# Patient Record
Sex: FEMALE | Race: BLACK OR AFRICAN AMERICAN | NOT HISPANIC OR LATINO | URBAN - METROPOLITAN AREA
[De-identification: names, ages, dates, MRNs, and addresses within clinical notes are randomized per-mention and may not be internally consistent; named-entity substitution may affect disease eponyms.]

---

## 2023-05-23 ENCOUNTER — OFFICE VISIT (OUTPATIENT)
Dept: PRIMARY CARE | Facility: CLINIC | Age: 38
End: 2023-05-23
Payer: COMMERCIAL

## 2023-05-23 VITALS
TEMPERATURE: 97.1 F | DIASTOLIC BLOOD PRESSURE: 98 MMHG | BODY MASS INDEX: 41.95 KG/M2 | SYSTOLIC BLOOD PRESSURE: 148 MMHG | HEART RATE: 83 BPM | WEIGHT: 293 LBS | HEIGHT: 70 IN | OXYGEN SATURATION: 97 %

## 2023-05-23 DIAGNOSIS — M54.42 ACUTE LEFT-SIDED LOW BACK PAIN WITH LEFT-SIDED SCIATICA: Primary | ICD-10-CM

## 2023-05-23 PROBLEM — K80.20 CHOLELITHIASIS: Status: ACTIVE | Noted: 2023-05-23

## 2023-05-23 PROBLEM — E66.01 MORBID OBESITY WITH BODY MASS INDEX (BMI) OF 40.0 OR HIGHER (MULTI): Status: ACTIVE | Noted: 2023-05-23

## 2023-05-23 PROBLEM — R79.89 ABNORMAL TSH: Status: ACTIVE | Noted: 2023-05-23

## 2023-05-23 PROCEDURE — 99213 OFFICE O/P EST LOW 20 MIN: CPT | Performed by: FAMILY MEDICINE

## 2023-05-23 PROCEDURE — 1036F TOBACCO NON-USER: CPT | Performed by: FAMILY MEDICINE

## 2023-05-23 RX ORDER — METHYLPREDNISOLONE 4 MG/1
TABLET ORAL
Qty: 21 TABLET | Refills: 0 | Status: SHIPPED | OUTPATIENT
Start: 2023-05-23 | End: 2023-05-30

## 2023-05-23 RX ORDER — CYCLOBENZAPRINE HCL 10 MG
TABLET ORAL
Qty: 30 TABLET | Refills: 0 | Status: SHIPPED | OUTPATIENT
Start: 2023-05-23

## 2023-05-23 RX ORDER — CYCLOBENZAPRINE HCL 10 MG
TABLET ORAL
COMMUNITY
Start: 2023-01-10 | End: 2023-05-23 | Stop reason: SDUPTHER

## 2023-05-23 ASSESSMENT — PATIENT HEALTH QUESTIONNAIRE - PHQ9
1. LITTLE INTEREST OR PLEASURE IN DOING THINGS: NOT AT ALL
SUM OF ALL RESPONSES TO PHQ9 QUESTIONS 1 AND 2: 0
2. FEELING DOWN, DEPRESSED OR HOPELESS: NOT AT ALL

## 2023-05-23 ASSESSMENT — ENCOUNTER SYMPTOMS: BACK PAIN: 1

## 2023-05-23 ASSESSMENT — PAIN SCALES - GENERAL: PAINLEVEL: 9

## 2023-05-23 NOTE — PATIENT INSTRUCTIONS
Recurrence of low back pain with left sided sciatica.  Continue stretches, given by PT.  Medrol dose pack sent to pharmacy, and can use cyclobenzaprine as needed for muscle spasm.  Reach out if not improving, in which case longer steroid taper may be needed, or PT again.

## 2023-05-23 NOTE — PROGRESS NOTES
"Subjective   Patient ID: Neva Allen is a 37 y.o. female who presents for Back Pain (Pt. Presents for sciatica pain ).  Had been doing better after PT.  Has been a lot more active.  Also changed diet.  2.5 weeks about put swing set up, and it started hurting a lot more.    Started as soreness, then progressed to the sciata.  Down left leg.  Was taking ibuprofen, now aleve.     Back Pain        Review of Systems   Musculoskeletal:  Positive for back pain.       Objective   BP (!) 148/98 (BP Location: Left arm, Patient Position: Sitting, BP Cuff Size: Large adult)   Pulse 83   Temp 36.2 °C (97.1 °F) (Temporal)   Ht 1.765 m (5' 9.5\")   Wt 133 kg (294 lb)   LMP 04/25/2023 (Approximate)   SpO2 97%   BMI 42.79 kg/m²     Physical Exam  Constitutional:       General: She is not in acute distress.     Appearance: Normal appearance.   Musculoskeletal:      Lumbar back: Tenderness present. No swelling. Positive left straight leg raise test.        Back:    Neurological:      Mental Status: She is alert.           Assessment/Plan   Problem List Items Addressed This Visit    None  Visit Diagnoses       Acute left-sided low back pain with left-sided sciatica    -  Primary    Relevant Medications    methylPREDNISolone (Medrol Dospak) 4 mg tablets    cyclobenzaprine (Flexeril) 10 mg tablet               "

## 2023-05-26 DIAGNOSIS — M54.42 ACUTE LEFT-SIDED LOW BACK PAIN WITH LEFT-SIDED SCIATICA: Primary | ICD-10-CM

## 2023-05-26 RX ORDER — PREDNISONE 20 MG/1
TABLET ORAL
Qty: 18 TABLET | Refills: 0 | Status: SHIPPED | OUTPATIENT
Start: 2023-05-26

## 2023-05-26 RX ORDER — PREDNISONE 20 MG/1
TABLET ORAL
COMMUNITY
Start: 2023-01-10 | End: 2023-05-26 | Stop reason: SDUPTHER

## 2023-05-31 ENCOUNTER — PATIENT MESSAGE (OUTPATIENT)
Dept: PRIMARY CARE | Facility: CLINIC | Age: 38
End: 2023-05-31
Payer: COMMERCIAL

## 2023-05-31 DIAGNOSIS — M54.42 ACUTE LEFT-SIDED LOW BACK PAIN WITH LEFT-SIDED SCIATICA: Primary | ICD-10-CM

## 2023-06-04 RX ORDER — TRAMADOL HYDROCHLORIDE 50 MG/1
50 TABLET ORAL EVERY 6 HOURS PRN
Qty: 28 TABLET | Refills: 0 | Status: SHIPPED | OUTPATIENT
Start: 2023-06-04 | End: 2023-06-11

## 2023-06-13 DIAGNOSIS — M54.42 ACUTE LEFT-SIDED LOW BACK PAIN WITH LEFT-SIDED SCIATICA: Primary | ICD-10-CM

## 2023-06-15 ENCOUNTER — APPOINTMENT (OUTPATIENT)
Dept: PRIMARY CARE | Facility: CLINIC | Age: 38
End: 2023-06-15
Payer: COMMERCIAL

## 2023-07-07 ENCOUNTER — TELEPHONE (OUTPATIENT)
Dept: PRIMARY CARE | Facility: CLINIC | Age: 38
End: 2023-07-07
Payer: COMMERCIAL

## 2024-07-01 ENCOUNTER — APPOINTMENT (OUTPATIENT)
Dept: OBSTETRICS AND GYNECOLOGY | Facility: CLINIC | Age: 39
End: 2024-07-01
Payer: COMMERCIAL

## 2025-01-28 ENCOUNTER — HOSPITAL ENCOUNTER (EMERGENCY)
Facility: HOSPITAL | Age: 40
Discharge: HOME | End: 2025-01-28

## 2025-01-28 ENCOUNTER — APPOINTMENT (OUTPATIENT)
Dept: RADIOLOGY | Facility: HOSPITAL | Age: 40
End: 2025-01-28

## 2025-01-28 VITALS
DIASTOLIC BLOOD PRESSURE: 83 MMHG | BODY MASS INDEX: 41.95 KG/M2 | SYSTOLIC BLOOD PRESSURE: 151 MMHG | WEIGHT: 293 LBS | RESPIRATION RATE: 18 BRPM | HEIGHT: 70 IN | HEART RATE: 86 BPM | OXYGEN SATURATION: 98 % | TEMPERATURE: 98.6 F

## 2025-01-28 DIAGNOSIS — O20.0 THREATENED MISCARRIAGE (HHS-HCC): Primary | ICD-10-CM

## 2025-01-28 LAB
ABO GROUP (TYPE) IN BLOOD: NORMAL
ALBUMIN SERPL BCP-MCNC: 3.9 G/DL (ref 3.4–5)
ALP SERPL-CCNC: 47 U/L (ref 33–110)
ALT SERPL W P-5'-P-CCNC: 16 U/L (ref 7–45)
ANION GAP SERPL CALC-SCNC: 10 MMOL/L (ref 10–20)
ANTIBODY SCREEN: NORMAL
APPEARANCE UR: ABNORMAL
AST SERPL W P-5'-P-CCNC: 17 U/L (ref 9–39)
B-HCG SERPL-ACNC: ABNORMAL MIU/ML
BASOPHILS # BLD AUTO: 0.06 X10*3/UL (ref 0–0.1)
BASOPHILS NFR BLD AUTO: 0.5 %
BILIRUB SERPL-MCNC: 0.7 MG/DL (ref 0–1.2)
BILIRUB UR STRIP.AUTO-MCNC: NEGATIVE MG/DL
BUN SERPL-MCNC: 7 MG/DL (ref 6–23)
CALCIUM SERPL-MCNC: 8.8 MG/DL (ref 8.6–10.3)
CHLORIDE SERPL-SCNC: 104 MMOL/L (ref 98–107)
CO2 SERPL-SCNC: 25 MMOL/L (ref 21–32)
COLOR UR: ABNORMAL
CREAT SERPL-MCNC: 0.7 MG/DL (ref 0.5–1.05)
EGFRCR SERPLBLD CKD-EPI 2021: >90 ML/MIN/1.73M*2
EOSINOPHIL # BLD AUTO: 0.09 X10*3/UL (ref 0–0.7)
EOSINOPHIL NFR BLD AUTO: 0.7 %
ERYTHROCYTE [DISTWIDTH] IN BLOOD BY AUTOMATED COUNT: 13.2 % (ref 11.5–14.5)
GLUCOSE SERPL-MCNC: 92 MG/DL (ref 74–99)
GLUCOSE UR STRIP.AUTO-MCNC: NORMAL MG/DL
HCT VFR BLD AUTO: 42.5 % (ref 36–46)
HGB BLD-MCNC: 13.7 G/DL (ref 12–16)
HOLD SPECIMEN: NORMAL
IMM GRANULOCYTES # BLD AUTO: 0.07 X10*3/UL (ref 0–0.7)
IMM GRANULOCYTES NFR BLD AUTO: 0.5 % (ref 0–0.9)
KETONES UR STRIP.AUTO-MCNC: ABNORMAL MG/DL
LEUKOCYTE ESTERASE UR QL STRIP.AUTO: ABNORMAL
LYMPHOCYTES # BLD AUTO: 2.49 X10*3/UL (ref 1.2–4.8)
LYMPHOCYTES NFR BLD AUTO: 18.8 %
MCH RBC QN AUTO: 28.6 PG (ref 26–34)
MCHC RBC AUTO-ENTMCNC: 32.2 G/DL (ref 32–36)
MCV RBC AUTO: 89 FL (ref 80–100)
MONOCYTES # BLD AUTO: 0.47 X10*3/UL (ref 0.1–1)
MONOCYTES NFR BLD AUTO: 3.5 %
MUCOUS THREADS #/AREA URNS AUTO: ABNORMAL /LPF
NEUTROPHILS # BLD AUTO: 10.09 X10*3/UL (ref 1.2–7.7)
NEUTROPHILS NFR BLD AUTO: 76 %
NITRITE UR QL STRIP.AUTO: NEGATIVE
NRBC BLD-RTO: 0 /100 WBCS (ref 0–0)
PH UR STRIP.AUTO: 7 [PH]
PLATELET # BLD AUTO: 371 X10*3/UL (ref 150–450)
POTASSIUM SERPL-SCNC: 4 MMOL/L (ref 3.5–5.3)
PROT SERPL-MCNC: 7.7 G/DL (ref 6.4–8.2)
PROT UR STRIP.AUTO-MCNC: ABNORMAL MG/DL
RBC # BLD AUTO: 4.79 X10*6/UL (ref 4–5.2)
RBC # UR STRIP.AUTO: ABNORMAL /UL
RBC #/AREA URNS AUTO: >20 /HPF
RH FACTOR (ANTIGEN D): NORMAL
SODIUM SERPL-SCNC: 135 MMOL/L (ref 136–145)
SP GR UR STRIP.AUTO: 1.02
SQUAMOUS #/AREA URNS AUTO: ABNORMAL /HPF
UROBILINOGEN UR STRIP.AUTO-MCNC: NORMAL MG/DL
WBC # BLD AUTO: 13.3 X10*3/UL (ref 4.4–11.3)
WBC #/AREA URNS AUTO: ABNORMAL /HPF

## 2025-01-28 PROCEDURE — 86901 BLOOD TYPING SEROLOGIC RH(D): CPT | Performed by: PHYSICIAN ASSISTANT

## 2025-01-28 PROCEDURE — 87086 URINE CULTURE/COLONY COUNT: CPT | Mod: AHULAB | Performed by: PHYSICIAN ASSISTANT

## 2025-01-28 PROCEDURE — 36415 COLL VENOUS BLD VENIPUNCTURE: CPT | Performed by: PHYSICIAN ASSISTANT

## 2025-01-28 PROCEDURE — 84702 CHORIONIC GONADOTROPIN TEST: CPT | Performed by: PHYSICIAN ASSISTANT

## 2025-01-28 PROCEDURE — 80053 COMPREHEN METABOLIC PANEL: CPT | Performed by: PHYSICIAN ASSISTANT

## 2025-01-28 PROCEDURE — 76801 OB US < 14 WKS SINGLE FETUS: CPT | Performed by: RADIOLOGY

## 2025-01-28 PROCEDURE — 81001 URINALYSIS AUTO W/SCOPE: CPT | Performed by: PHYSICIAN ASSISTANT

## 2025-01-28 PROCEDURE — 99284 EMERGENCY DEPT VISIT MOD MDM: CPT | Mod: 25

## 2025-01-28 PROCEDURE — 85025 COMPLETE CBC W/AUTO DIFF WBC: CPT | Performed by: PHYSICIAN ASSISTANT

## 2025-01-28 PROCEDURE — 76801 OB US < 14 WKS SINGLE FETUS: CPT

## 2025-01-28 PROCEDURE — 76817 TRANSVAGINAL US OBSTETRIC: CPT | Performed by: RADIOLOGY

## 2025-01-28 RX ORDER — ACETAMINOPHEN 325 MG/1
650 TABLET ORAL AS NEEDED
Status: DISCONTINUED | OUTPATIENT
Start: 2025-01-28 | End: 2025-01-28 | Stop reason: HOSPADM

## 2025-01-28 ASSESSMENT — PAIN DESCRIPTION - ORIENTATION: ORIENTATION: LOWER

## 2025-01-28 ASSESSMENT — PAIN DESCRIPTION - FREQUENCY: FREQUENCY: CONSTANT/CONTINUOUS

## 2025-01-28 ASSESSMENT — PAIN DESCRIPTION - DESCRIPTORS: DESCRIPTORS: CRAMPING

## 2025-01-28 ASSESSMENT — COLUMBIA-SUICIDE SEVERITY RATING SCALE - C-SSRS
6. HAVE YOU EVER DONE ANYTHING, STARTED TO DO ANYTHING, OR PREPARED TO DO ANYTHING TO END YOUR LIFE?: NO
1. IN THE PAST MONTH, HAVE YOU WISHED YOU WERE DEAD OR WISHED YOU COULD GO TO SLEEP AND NOT WAKE UP?: NO
2. HAVE YOU ACTUALLY HAD ANY THOUGHTS OF KILLING YOURSELF?: NO

## 2025-01-28 ASSESSMENT — PAIN DESCRIPTION - PROGRESSION: CLINICAL_PROGRESSION: NOT CHANGED

## 2025-01-28 ASSESSMENT — PAIN DESCRIPTION - ONSET: ONSET: SUDDEN

## 2025-01-28 ASSESSMENT — PAIN SCALES - GENERAL: PAINLEVEL_OUTOF10: 9

## 2025-01-28 ASSESSMENT — PAIN - FUNCTIONAL ASSESSMENT: PAIN_FUNCTIONAL_ASSESSMENT: 0-10

## 2025-01-28 ASSESSMENT — PAIN DESCRIPTION - LOCATION: LOCATION: ABDOMEN

## 2025-01-28 ASSESSMENT — PAIN DESCRIPTION - PAIN TYPE: TYPE: ACUTE PAIN

## 2025-01-28 NOTE — ED TRIAGE NOTES
Patient presents to ED from home for vaginal bleeding while pregnant. Patient endorses bright red blood, clots, and cramping that started this morning after sex. LMP 2024, .

## 2025-01-28 NOTE — DISCHARGE INSTRUCTIONS
Follow up in 48 hours for blood hormone level check and re-evaluation.  If anything worsens in your condition come back to the emergency department.

## 2025-01-28 NOTE — ED PROVIDER NOTES
HPI   Chief Complaint   Patient presents with    Vaginal Bleeding - Pregnant       History of present illness: 39-year-old female presents for vaginal bleeding since last night.  She is currently pregnant.  Her last menstrual period was around 12/15/2024.  Patient states that she had small amount of bleeding and clotting last night.  It did continue this morning.  Patient has associated cramping.  She is a G3, P1 with previous elective .  Patient has her first prenatal appointment coming up in February.  Denies nausea vomiting vaginal discharge dysuria polyuria diarrhea rectal bleeding.  Symptoms began shortly after she had sex with her boyfriend.    Review of systems: Constitutional, eye, ENT, cardiovascular, respiratory, gastrointestinal, genitourinary, neurologic, musculoskeletal, dermatologic, hematologic, endocrine systems were evaluated and were negative unless otherwise specified in history of present illness.    Medications: Reviewed and per nursing note.    Family history: Denies relevant medical conditions.    Social history: Denies tobacco, alcohol, drug use.      Physical exam:    Appearance: Well-developed, well-nourished, nontoxic-appearing, alert and oriented x3. Talking in complete sentences.    HEENT:  Head normocephalic atraumatic, extraocular movements intact, mucous membranes are moist and pink, trachea is midline.    NECK:  Nml Inspection    Respiratory: Clear to auscultation bilaterally with normal bilateral excursion. No wheezes, rhonchi, rales.    Cardiovascular: Regular rate and rhythm, no murmurs rubs or gallops.    GI: Soft, nontender, nondistended    Female genital: Normal external vagina, no active bleeding.    Neuro:  Oriented x 3, Speech Clear, cranial nerves grossly intact.    Musculoskeletal: Patient spontaneously moves all 4 extremities.    Skin:  No rashes.            Patient History   Past Medical History:   Diagnosis Date    Encounter for  screening for  Streptococcus B ([facility])     Screening, , for Streptococcus B    Encounter for screening for diabetes mellitus 2019    Diabetes mellitus screening    Encounter for screening for other disorder     Screening for blood or protein in urine    Encounter for supervision of normal pregnancy, unspecified, unspecified trimester ([facility]) 2019    Encounter for supervision of normal pregnancy    Exceptionally large  baby ([facility]) 2019    Macrosomia    Other conditions influencing health status 2019    History of cough    Personal history of other diseases of the respiratory system 2019    History of influenza    Personal history of other medical treatment 10/03/2019    History of removal of staples     Past Surgical History:   Procedure Laterality Date    OTHER SURGICAL HISTORY  2019    Dilation and curettage    OTHER SURGICAL HISTORY  10/03/2019     section low transverse     No family history on file.  Social History     Tobacco Use    Smoking status: Never     Passive exposure: Never    Smokeless tobacco: Never   Substance Use Topics    Alcohol use: Not on file    Drug use: Not on file       Physical Exam   ED Triage Vitals [25 0854]   Temperature Heart Rate Respirations BP   37 °C (98.6 °F) 86 18 151/83      Pulse Ox Temp Source Heart Rate Source Patient Position   98 % Temporal Monitor Sitting      BP Location FiO2 (%)     Left arm --       Physical Exam      ED Course & MDM   Diagnoses as of 25 1225   Threatened miscarriage ([facility])                 No data recorded     [place] Coma Scale Score: 15 (25 0856 : Daria Patel, TONIO)                           Medical Decision Making  US PELVIS OB TRANSABDOMINAL W TRANSVAGINAL UP TO 1ST TRIMESTER   Final Result    1. Single live intrauterine pregnancy.    2. Estimated gestational age: 6 weeks 1 days +/-3 days.          MACRO:    None          Signed by: Derek Hernandez 2025 11:45 AM    Dictation  workstation:   [id number]       Labs Reviewed  CBC WITH AUTO DIFFERENTIAL - Abnormal     WBC                           13.3 (*)               nRBC                          0.0                    RBC                           4.79                   Hemoglobin                    13.7                   Hematocrit                    42.5                   MCV                           89                     MCH                           28.6                   MCHC                          32.2                   RDW                           13.2                   Platelets                     371                    Neutrophils %                 76.0                   Immature Granulocytes %, Automated   0.5                    Lymphocytes %                 18.8                   Monocytes %                   3.5                    Eosinophils %                 0.7                    Basophils %                   0.5                    Neutrophils Absolute          10.09 (*)               Immature Granulocytes Absolute, Au*   0.07                   Lymphocytes Absolute          2.49                   Monocytes Absolute            0.47                   Eosinophils Absolute          0.09                   Basophils Absolute            0.06                COMPREHENSIVE METABOLIC PANEL - Abnormal     Glucose                       92                     Sodium                        135 (*)                Potassium                     4.0                    Chloride                      104                    Bicarbonate                   25                     Anion Gap                     10                     Urea Nitrogen                 7                      Creatinine                    0.70                   eGFR                          >90                    Calcium                       8.8                    Albumin                       3.9                    Alkaline Phosphatase          47                      Total Protein                 7.7                    AST                           17                     Bilirubin, Total              0.7                    ALT                           16                  HUMAN CHORIONIC GONADOTROPIN, SERUM QUANTITATIVE - Abnormal     HCG, Beta-Quantitative        16,561 (*)                   Narrative:  Total HCG measurement is performed using the [employer] [place] Access                   Immunoassay which detects intact HCG and free beta HCG subunit.                    This test is not indicated for use as a tumor marker.                   HCG testing is performed using a different test methodology at [facility] than other [facility]. Direct result comparison                   should only be made within the same method.                      URINALYSIS WITH REFLEX CULTURE AND MICROSCOPIC - Abnormal     Color, Urine                  Brown (*)               Appearance, Urine             Turbid (*)               Specific Gravity, Urine       1.016                  pH, Urine                     7.0                    Protein, Urine                50 (1+) (*)               Glucose, Urine                Normal                 Blood, Urine                  OVER (3+) (*)               Ketones, Urine                TRACE (*)               Bilirubin, Urine              NEGATIVE                Urobilinogen, Urine           Normal                 Nitrite, Urine                NEGATIVE                Leukocyte Esterase, Urine     250 Aries/uL (*)                   Narrative: OVER is reported when the result is greater than the clinically reportable range.  MICROSCOPIC ONLY, URINE - Abnormal     WBC, Urine                    21-50 (*)               RBC, Urine                    >20 (*)                Squamous Epithelial Cells, Urine   10-25 (FEW)                Mucus, Urine                  FEW                 URINE CULTURE  URINALYSIS WITH REFLEX CULTURE  AND MICROSCOPIC         Narrative: The following orders were created for panel order Urinalysis with Reflex Culture and Microscopic.                  Procedure                               Abnormality         Status                                     ---------                               -----------         ------                                     Urinalysis with Reflex C...[[id number]]  Abnormal            Final result                               Extra Urine Gray Tube[[id number]]                                                                                         Please view results for these tests on the individual orders.  TYPE AND SCREEN  EXTRA URINE GRAY TUBE      39-year-old female complains of vaginal bleeding since last night.  Differential diagnosis of ectopic pregnancy, threatened miscarriage, spontaneous miscarriage, PID.  Examination shows nontender abdomen.  No significant active bleeding.    CBC CMP beta-hCG level urinalysis pelvic ultrasound type and screen ordered.  Patient is O+ and does not require RhoGAM.  Hemoglobin 13.7 which is normal, chemistry showed no significant abnormalities.  A beta-hCG level 16,561.  Reasonable initial test will need to track.  Urinalysis shows contamination of blood with no bacteria or clear urinary tract infection.  Ultrasound shows intrauterine pregnancy with gestational sac calculation at 6 weeks 1 day consistent with patient's last.  Fetal cardiac motion was visualized but not able to be measured.  Patient presentation is consistent with threatened miscarriage with no clear miscarriage or other complication at this time.  Patient will need to follow-up with OB/GYN in 48 hours for repeat evaluation and hCG level.    Patient will be discharged to home with recommendation for rest and to take Tylenol as needed.  Patient is educated in signs and symptoms of worsening symptoms and reasons to come back to the emergency department.  Will need to follow up with  OB/GYN in 48 hours.  Patient does not report social determinants of health impacting ability to obtain care that is needed.  Patient agrees with plan.    This is a transcription.  Text was reviewed for errors, but some transcription errors may remain.  Please call for any questions.          Procedure  Procedures     Selwyn Valentine PA-C  01/28/25 [id number]

## 2025-01-30 LAB — BACTERIA UR CULT: NORMAL

## 2025-02-02 ENCOUNTER — HOSPITAL ENCOUNTER (EMERGENCY)
Facility: HOSPITAL | Age: 40
Discharge: HOME | End: 2025-02-02
Attending: EMERGENCY MEDICINE
Payer: COMMERCIAL

## 2025-02-02 ENCOUNTER — APPOINTMENT (OUTPATIENT)
Dept: RADIOLOGY | Facility: HOSPITAL | Age: 40
End: 2025-02-02
Payer: COMMERCIAL

## 2025-02-02 VITALS
TEMPERATURE: 97.9 F | HEART RATE: 87 BPM | RESPIRATION RATE: 18 BRPM | OXYGEN SATURATION: 99 % | SYSTOLIC BLOOD PRESSURE: 144 MMHG | DIASTOLIC BLOOD PRESSURE: 86 MMHG

## 2025-02-02 DIAGNOSIS — R31.9 URINARY TRACT INFECTION WITH HEMATURIA, SITE UNSPECIFIED: ICD-10-CM

## 2025-02-02 DIAGNOSIS — O20.8 SUBCHORIONIC HEMORRHAGE IN FIRST TRIMESTER (HHS-HCC): ICD-10-CM

## 2025-02-02 DIAGNOSIS — N39.0 URINARY TRACT INFECTION WITH HEMATURIA, SITE UNSPECIFIED: ICD-10-CM

## 2025-02-02 DIAGNOSIS — O46.90 VAGINAL BLEEDING DURING PREGNANCY (HHS-HCC): Primary | ICD-10-CM

## 2025-02-02 LAB
ALBUMIN SERPL BCP-MCNC: 3.7 G/DL (ref 3.4–5)
ALP SERPL-CCNC: 56 U/L (ref 33–110)
ALT SERPL W P-5'-P-CCNC: 13 U/L (ref 7–45)
ANION GAP SERPL CALC-SCNC: 12 MMOL/L (ref 10–20)
APPEARANCE UR: CLEAR
APTT PPP: 31 SECONDS (ref 27–38)
AST SERPL W P-5'-P-CCNC: 15 U/L (ref 9–39)
B-HCG SERPL-ACNC: ABNORMAL MIU/ML
BACTERIA #/AREA URNS AUTO: ABNORMAL /HPF
BASOPHILS # BLD AUTO: 0.09 X10*3/UL (ref 0–0.1)
BASOPHILS NFR BLD AUTO: 0.6 %
BILIRUB SERPL-MCNC: 0.7 MG/DL (ref 0–1.2)
BILIRUB UR STRIP.AUTO-MCNC: NEGATIVE MG/DL
BUN SERPL-MCNC: 8 MG/DL (ref 6–23)
CALCIUM SERPL-MCNC: 9 MG/DL (ref 8.6–10.3)
CHLORIDE SERPL-SCNC: 104 MMOL/L (ref 98–107)
CO2 SERPL-SCNC: 24 MMOL/L (ref 21–32)
COLOR UR: COLORLESS
CREAT SERPL-MCNC: 0.74 MG/DL (ref 0.5–1.05)
EGFRCR SERPLBLD CKD-EPI 2021: >90 ML/MIN/1.73M*2
EOSINOPHIL # BLD AUTO: 0.07 X10*3/UL (ref 0–0.7)
EOSINOPHIL NFR BLD AUTO: 0.5 %
ERYTHROCYTE [DISTWIDTH] IN BLOOD BY AUTOMATED COUNT: 13.2 % (ref 11.5–14.5)
GLUCOSE SERPL-MCNC: 83 MG/DL (ref 74–99)
GLUCOSE UR STRIP.AUTO-MCNC: NORMAL MG/DL
HCT VFR BLD AUTO: 42.8 % (ref 36–46)
HGB BLD-MCNC: 14.2 G/DL (ref 12–16)
IMM GRANULOCYTES # BLD AUTO: 0.07 X10*3/UL (ref 0–0.7)
IMM GRANULOCYTES NFR BLD AUTO: 0.5 % (ref 0–0.9)
INR PPP: 1.2 (ref 0.9–1.1)
KETONES UR STRIP.AUTO-MCNC: NEGATIVE MG/DL
LEUKOCYTE ESTERASE UR QL STRIP.AUTO: ABNORMAL
LYMPHOCYTES # BLD AUTO: 2.69 X10*3/UL (ref 1.2–4.8)
LYMPHOCYTES NFR BLD AUTO: 18.5 %
MCH RBC QN AUTO: 29.6 PG (ref 26–34)
MCHC RBC AUTO-ENTMCNC: 33.2 G/DL (ref 32–36)
MCV RBC AUTO: 89 FL (ref 80–100)
MONOCYTES # BLD AUTO: 0.57 X10*3/UL (ref 0.1–1)
MONOCYTES NFR BLD AUTO: 3.9 %
MUCOUS THREADS #/AREA URNS AUTO: ABNORMAL /LPF
NEUTROPHILS # BLD AUTO: 11.07 X10*3/UL (ref 1.2–7.7)
NEUTROPHILS NFR BLD AUTO: 76 %
NITRITE UR QL STRIP.AUTO: NEGATIVE
NRBC BLD-RTO: 0 /100 WBCS (ref 0–0)
PH UR STRIP.AUTO: 6 [PH]
PLATELET # BLD AUTO: 372 X10*3/UL (ref 150–450)
POTASSIUM SERPL-SCNC: 3.9 MMOL/L (ref 3.5–5.3)
PROT SERPL-MCNC: 7.5 G/DL (ref 6.4–8.2)
PROT UR STRIP.AUTO-MCNC: NEGATIVE MG/DL
PROTHROMBIN TIME: 13.7 SECONDS (ref 9.8–12.8)
RBC # BLD AUTO: 4.79 X10*6/UL (ref 4–5.2)
RBC # UR STRIP.AUTO: ABNORMAL /UL
RBC #/AREA URNS AUTO: >20 /HPF
SODIUM SERPL-SCNC: 136 MMOL/L (ref 136–145)
SP GR UR STRIP.AUTO: 1
SQUAMOUS #/AREA URNS AUTO: ABNORMAL /HPF
UROBILINOGEN UR STRIP.AUTO-MCNC: NORMAL MG/DL
WBC # BLD AUTO: 14.6 X10*3/UL (ref 4.4–11.3)
WBC #/AREA URNS AUTO: ABNORMAL /HPF

## 2025-02-02 PROCEDURE — 85730 THROMBOPLASTIN TIME PARTIAL: CPT | Performed by: NURSE PRACTITIONER

## 2025-02-02 PROCEDURE — 84443 ASSAY THYROID STIM HORMONE: CPT | Performed by: OBSTETRICS & GYNECOLOGY

## 2025-02-02 PROCEDURE — 85610 PROTHROMBIN TIME: CPT | Performed by: NURSE PRACTITIONER

## 2025-02-02 PROCEDURE — 84702 CHORIONIC GONADOTROPIN TEST: CPT | Performed by: NURSE PRACTITIONER

## 2025-02-02 PROCEDURE — 76801 OB US < 14 WKS SINGLE FETUS: CPT

## 2025-02-02 PROCEDURE — 87389 HIV-1 AG W/HIV-1&-2 AB AG IA: CPT | Mod: AHULAB | Performed by: OBSTETRICS & GYNECOLOGY

## 2025-02-02 PROCEDURE — 36415 COLL VENOUS BLD VENIPUNCTURE: CPT | Performed by: NURSE PRACTITIONER

## 2025-02-02 PROCEDURE — 87086 URINE CULTURE/COLONY COUNT: CPT | Mod: AHULAB | Performed by: NURSE PRACTITIONER

## 2025-02-02 PROCEDURE — 76801 OB US < 14 WKS SINGLE FETUS: CPT | Performed by: RADIOLOGY

## 2025-02-02 PROCEDURE — 76817 TRANSVAGINAL US OBSTETRIC: CPT | Performed by: RADIOLOGY

## 2025-02-02 PROCEDURE — 81001 URINALYSIS AUTO W/SCOPE: CPT | Performed by: NURSE PRACTITIONER

## 2025-02-02 PROCEDURE — 86803 HEPATITIS C AB TEST: CPT | Mod: AHULAB | Performed by: OBSTETRICS & GYNECOLOGY

## 2025-02-02 PROCEDURE — 83021 HEMOGLOBIN CHROMOTOGRAPHY: CPT | Mod: AHULAB | Performed by: OBSTETRICS & GYNECOLOGY

## 2025-02-02 PROCEDURE — 85025 COMPLETE CBC W/AUTO DIFF WBC: CPT | Performed by: NURSE PRACTITIONER

## 2025-02-02 PROCEDURE — 83036 HEMOGLOBIN GLYCOSYLATED A1C: CPT | Mod: AHULAB | Performed by: OBSTETRICS & GYNECOLOGY

## 2025-02-02 PROCEDURE — 99284 EMERGENCY DEPT VISIT MOD MDM: CPT | Mod: 25 | Performed by: EMERGENCY MEDICINE

## 2025-02-02 PROCEDURE — 2500000002 HC RX 250 W HCPCS SELF ADMINISTERED DRUGS (ALT 637 FOR MEDICARE OP, ALT 636 FOR OP/ED): Performed by: NURSE PRACTITIONER

## 2025-02-02 PROCEDURE — 80053 COMPREHEN METABOLIC PANEL: CPT | Performed by: NURSE PRACTITIONER

## 2025-02-02 RX ORDER — NITROFURANTOIN 25; 75 MG/1; MG/1
100 CAPSULE ORAL ONCE
Status: COMPLETED | OUTPATIENT
Start: 2025-02-02 | End: 2025-02-02

## 2025-02-02 RX ORDER — NITROFURANTOIN 25; 75 MG/1; MG/1
100 CAPSULE ORAL 2 TIMES DAILY
Qty: 10 CAPSULE | Refills: 0 | Status: SHIPPED | OUTPATIENT
Start: 2025-02-02 | End: 2025-02-07

## 2025-02-02 RX ORDER — ACETAMINOPHEN 500 MG
500 TABLET ORAL EVERY 6 HOURS PRN
Qty: 30 TABLET | Refills: 0 | Status: SHIPPED | OUTPATIENT
Start: 2025-02-02 | End: 2025-02-12

## 2025-02-02 RX ADMIN — NITROFURANTOIN MONOHYDRATE/MACROCRYSTALS 100 MG: 25; 75 CAPSULE ORAL at 17:38

## 2025-02-02 ASSESSMENT — COLUMBIA-SUICIDE SEVERITY RATING SCALE - C-SSRS
1. IN THE PAST MONTH, HAVE YOU WISHED YOU WERE DEAD OR WISHED YOU COULD GO TO SLEEP AND NOT WAKE UP?: NO
6. HAVE YOU EVER DONE ANYTHING, STARTED TO DO ANYTHING, OR PREPARED TO DO ANYTHING TO END YOUR LIFE?: NO
2. HAVE YOU ACTUALLY HAD ANY THOUGHTS OF KILLING YOURSELF?: NO

## 2025-02-02 NOTE — ED TRIAGE NOTES
Pt. C/o vaginal bleeding and passing clots that has gotten worse since last visit on Tuesday. Pt is 7 weeks pregnant.

## 2025-02-02 NOTE — DISCHARGE INSTRUCTIONS
Follow up with OB/GYN on Tuesday as scheduled for further evaluation and management of care    Prescription sent to pharmacy. Take medications as prescribed     Follow up instructions discussed. ED precautions discussed and advised to return to ED if symptoms worsen or persist for follow up.

## 2025-02-03 LAB — BACTERIA UR CULT: NORMAL

## 2025-02-03 NOTE — ED PROVIDER NOTES
HPI     CC: Vaginal Bleeding     HPI: Neva Allen is a 39 y.o. female with no past medical history presents for complaints of worsening vaginal bleeding. She endorses associated suprapubic light cramping and clotting. She reports symptoms initially began shortly after she had sex with her boyfriend. She is currently 6 weeks pregnant. Her last menstrual period was around 12/15/2024. Patient states she had some heavy bleeding and passed a large clot this morning. She is a G3, P1 with previous elective . She is currently scheduled for her first OB appointment on Tuesday. Denies nausea or vomiting. Denies dysuria, or polyuria. She reports symptoms initially began shortly after she had sex with her boyfriend.     ROS: 10-point review of systems was performed and is otherwise negative except as noted in HPI.      Past Medical History: Noncontributory except per HPI     Past Surgical History: Noncontributory except per HPI     Family History: Reviewed and noncontributory     Social History:  Noncontributory except per HPI       Allergies   Allergen Reactions    Cefadroxil Rash    Penicillins Rash and Hives    Sulfamethoxazole-Trimethoprim Rash       Past Medical History:   Diagnosis Date    Encounter for  screening for Streptococcus B     Screening, , for Streptococcus B    Encounter for screening for diabetes mellitus 2019    Diabetes mellitus screening    Encounter for screening for other disorder     Screening for blood or protein in urine    Encounter for supervision of normal pregnancy, unspecified, unspecified trimester 2019    Encounter for supervision of normal pregnancy    Exceptionally large  baby ([facility]) 2019    Macrosomia    Other conditions influencing health status 2019    History of cough    Personal history of other diseases of the respiratory system 2019    History of influenza    Personal history of other medical treatment 10/03/2019     History of removal of staples       Home Meds:   Current Outpatient Medications   Medication Instructions    acetaminophen (TYLENOL) 500 mg, oral, Every 6 hours PRN    cyclobenzaprine (Flexeril) 10 mg tablet TAKE 1/2 TO 1 TABLET BY MOUTH TWICE DAILY AS NEEDED    nitrofurantoin, macrocrystal-monohydrate, (Macrobid) 100 mg capsule 100 mg, oral, 2 times daily    predniSONE (Deltasone) 20 mg tablet TAKE 3 TABLETS BY MOUTH DAILY FOR 3 DAYS, THEN 2 TABLETS DAILY X3 DAYS, THEN 1 TABLET DAILY X3 DAYS        ED Triage Vitals [02/02/25 1445]   Temperature Heart Rate Respirations BP   36.6 °C (97.9 °F) 87 18 144/86      Pulse Ox Temp Source Heart Rate Source Patient Position   99 % Oral Monitor Sitting      BP Location FiO2 (%)     -- --         Heart Rate:  [87]   Temperature:  [36.6 °C (97.9 °F)]   Respirations:  [18]   BP: (144)/(86)   Pulse Ox:  [99 %]      Physical Exam:  Physical Exam  Vitals and nursing note reviewed.   Constitutional:       General: She is not in acute distress.     Appearance: She is well-developed.   HENT:      Head: Normocephalic and atraumatic.   Eyes:      Conjunctiva/sclera: Conjunctivae normal.   Cardiovascular:      Rate and Rhythm: Normal rate and regular rhythm.      Heart sounds: No murmur heard.  Pulmonary:      Effort: Pulmonary effort is normal. No respiratory distress.      Breath sounds: Normal breath sounds.   Abdominal:      General: Bowel sounds are normal.      Palpations: Abdomen is soft.      Tenderness: There is no abdominal tenderness. There is no guarding or rebound. Negative signs include McBurney's sign and psoas sign.   Musculoskeletal:         General: No swelling.      Cervical back: Neck supple.   Skin:     General: Skin is warm and dry.      Capillary Refill: Capillary refill takes less than 2 seconds.   Neurological:      Mental Status: She is alert.   Psychiatric:         Mood and Affect: Mood normal.          Diagnostic Results        Labs Reviewed   HUMAN CHORIONIC  GONADOTROPIN, SERUM QUANTITATIVE - Abnormal       Result Value    HCG, Beta-Quantitative 24,352 (*)     Narrative:      Total HCG measurement is performed using the [employer] [place] Access   Immunoassay which detects intact HCG and free beta HCG subunit.    This test is not indicated for use as a tumor marker.   HCG testing is performed using a different test methodology at [facility] than other [facility]. Direct result comparison   should only be made within the same method.       CBC WITH AUTO DIFFERENTIAL - Abnormal    WBC 14.6 (*)     nRBC 0.0      RBC 4.79      Hemoglobin 14.2      Hematocrit 42.8      MCV 89      MCH 29.6      MCHC 33.2      RDW 13.2      Platelets 372      Neutrophils % 76.0      Immature Granulocytes %, Automated 0.5      Lymphocytes % 18.5      Monocytes % 3.9      Eosinophils % 0.5      Basophils % 0.6      Neutrophils Absolute 11.07 (*)     Immature Granulocytes Absolute, Automated 0.07      Lymphocytes Absolute 2.69      Monocytes Absolute 0.57      Eosinophils Absolute 0.07      Basophils Absolute 0.09     PROTIME-INR - Abnormal    Protime 13.7 (*)     INR 1.2 (*)    URINALYSIS WITH REFLEX CULTURE AND MICROSCOPIC - Abnormal    Color, Urine Colorless (*)     Appearance, Urine Clear      Specific Gravity, Urine 1.005      pH, Urine 6.0      Protein, Urine NEGATIVE      Glucose, Urine Normal      Blood, Urine OVER (3+) (*)     Ketones, Urine NEGATIVE      Bilirubin, Urine NEGATIVE      Urobilinogen, Urine Normal      Nitrite, Urine NEGATIVE      Leukocyte Esterase, Urine 250 Aries/uL (*)     Narrative:     OVER is reported when the result is greater than the clinically reportable range.   MICROSCOPIC ONLY, URINE - Abnormal    WBC, Urine 11-20 (*)     RBC, Urine >20 (*)     Squamous Epithelial Cells, Urine 1-9 (SPARSE)      Bacteria, Urine 1+ (*)     Mucus, Urine FEW     COMPREHENSIVE METABOLIC PANEL - Normal    Glucose 83      Sodium 136      Potassium 3.9      Chloride  104      Bicarbonate 24      Anion Gap 12      Urea Nitrogen 8      Creatinine 0.74      eGFR >90      Calcium 9.0      Albumin 3.7      Alkaline Phosphatase 56      Total Protein 7.5      AST 15      Bilirubin, Total 0.7      ALT 13     APTT - Normal    aPTT 31      Narrative:     The APTT is no longer used for monitoring Unfractionated Heparin Therapy. For monitoring Heparin Therapy, use the Heparin Assay.   URINE CULTURE   URINALYSIS WITH REFLEX CULTURE AND MICROSCOPIC    Narrative:     The following orders were created for panel order Urinalysis with Reflex Culture and Microscopic.  Procedure                               Abnormality         Status                     ---------                               -----------         ------                     Urinalysis with Reflex C...[[id number]]  Abnormal            Final result               Extra Urine Gray Tube[[id number]]                                                         Please view results for these tests on the individual orders.   EXTRA URINE GRAY TUBE         US PELVIS OB TRANSABDOMINAL W TRANSVAGINAL UP TO 1ST TRIMESTER   Final Result   1.  Single living intrauterine gestation of 6 weeks 1 day based on a   crown-rump length. Small subchorionic hemorrhage noted.   2. Normal ultrasound of the adnexa.        MACRO:   None        Signed by: Sary Lucio 2/2/2025 1:56 PM   Dictation workstation:   [id number]                    No data recorded                Procedure  Procedures    ED Course & MDM   Assessment/Plan:     Medications   nitrofurantoin (macrocrystal-monohydrate) (Macrobid) capsule 100 mg (100 mg oral Given 2/2/25 4532)        Diagnoses as of 02/03/25 1401   Vaginal bleeding during pregnancy ([facility])   Urinary tract infection with hematuria, site unspecified   Subchorionic hemorrhage in first trimester ([facility])       Medical Decision Making    Neva Allen is a 39 y.o. female independent historian presents for complaints of worsening vaginal  bleeding, light cramping and clotting. She apparently was seen in here on Tuesday for similar complaints after having sex with her boyfriend for to have a possible threatened miscarriage and advised to repeat HCG (16,561) in two days and follow up with OB. States today had some heavy bleeding and passed a large blood clot and was concerned. She reports did not have HCG repeated and will see OB on Tuesday. She is currently 6 weeks pregnant. She is a G3, P1 with previous elective .     On exam she is alert and oriented X3, NAD noted. Afebrile,VSS. Lungs clear throughout. Heart RRR. Abdomen soft non tender, non distended, BSX4. No palpable pain noted.     Basic labs obtained and WBC 14.6 likely 2/2 urine positive for UTI. HCG increased from 16.561 to 24,352. She is given Nitrofurantoin in the ED for UTI given her allergies to antibiotics. This was the next best option.  US showed single living intrauterine gestation of 6 weeks 1 day based on a crown-rump length. Small subchorionic hemorrhage.    I discussed this patient care with Dr. Woodard who also evaluated the patient.    See Diagnosis.  I discussed findings with patient and they are safe for discharge and outpatient treatment. She is advised to follow up with OB/GYN on Tuesday as scheduled for further evaluation and management of care    Prescription Nitrofurantoin and Acetaminophen sent to pharmacy. Take medications as prescribed     Follow up instructions discussed. ED precautions discussed and advised to return to ED if symptoms worsen or persist for follow up.    Counseling: Spoke with the patient and discussed today´s findings, in addition to providing specific details for the plan of care and expected course. Patient was given the opportunity to ask questions     She expressed understanding was agreeable with plan and discharge at this time. Discharged in hemodynamically stable condition.              Disposition: Home    ED Prescriptions        Medication Sig Dispense Start Date End Date Auth. Provider    nitrofurantoin, macrocrystal-monohydrate, (Macrobid) 100 mg capsule Take 1 capsule (100 mg) by mouth 2 times a day for 5 days. 10 capsule 2/2/2025 2/7/2025 ELKIN Lenz    acetaminophen (Tylenol) 500 mg tablet Take 1 tablet (500 mg) by mouth every 6 hours if needed for mild pain (1 - 3) for up to 10 days. 30 tablet 2/2/2025 2/12/2025 ELKIN Lenz            Social Determinants Affecting Care: none     ELKIN Miller    This note was dictated by speech recognition. Minor errors in transcription may be present.     ELKIN Lenz  02/03/25 9250

## 2025-02-04 ENCOUNTER — APPOINTMENT (OUTPATIENT)
Dept: OBSTETRICS AND GYNECOLOGY | Facility: CLINIC | Age: 40
End: 2025-02-04
Payer: COMMERCIAL

## 2025-02-04 VITALS — DIASTOLIC BLOOD PRESSURE: 72 MMHG | WEIGHT: 293 LBS | SYSTOLIC BLOOD PRESSURE: 120 MMHG | BODY MASS INDEX: 46.49 KG/M2

## 2025-02-04 DIAGNOSIS — E03.9 HYPOTHYROIDISM, UNSPECIFIED TYPE: ICD-10-CM

## 2025-02-04 DIAGNOSIS — O99.210 OBESITY IN PREGNANCY (HHS-HCC): ICD-10-CM

## 2025-02-04 DIAGNOSIS — O09.91 HIGH-RISK PREGNANCY IN FIRST TRIMESTER (HHS-HCC): Primary | ICD-10-CM

## 2025-02-04 DIAGNOSIS — O20.0 THREATENED MISCARRIAGE (HHS-HCC): ICD-10-CM

## 2025-02-04 DIAGNOSIS — O09.521 MULTIGRAVIDA OF ADVANCED MATERNAL AGE IN FIRST TRIMESTER (HHS-HCC): ICD-10-CM

## 2025-02-04 DIAGNOSIS — O26.859 SPOTTING IN PREGNANCY (HHS-HCC): ICD-10-CM

## 2025-02-04 LAB
PREGNANCY TEST URINE, POC: POSITIVE
TSH SERPL-ACNC: 4.26 MIU/L (ref 0.44–3.98)

## 2025-02-04 PROCEDURE — 81025 URINE PREGNANCY TEST: CPT | Performed by: OBSTETRICS & GYNECOLOGY

## 2025-02-04 PROCEDURE — 0500F INITIAL PRENATAL CARE VISIT: CPT | Performed by: OBSTETRICS & GYNECOLOGY

## 2025-02-04 RX ORDER — LEVOTHYROXINE SODIUM 75 UG/1
75 TABLET ORAL
COMMUNITY
Start: 2025-01-06

## 2025-02-04 SDOH — ECONOMIC STABILITY: FOOD INSECURITY: WITHIN THE PAST 12 MONTHS, YOU WORRIED THAT YOUR FOOD WOULD RUN OUT BEFORE YOU GOT MONEY TO BUY MORE.: NEVER TRUE

## 2025-02-04 SDOH — ECONOMIC STABILITY: FOOD INSECURITY: WITHIN THE PAST 12 MONTHS, THE FOOD YOU BOUGHT JUST DIDN'T LAST AND YOU DIDN'T HAVE MONEY TO GET MORE.: NEVER TRUE

## 2025-02-04 SDOH — HEALTH STABILITY: PHYSICAL HEALTH: ON AVERAGE, HOW MANY MINUTES DO YOU ENGAGE IN EXERCISE AT THIS LEVEL?: 30 MIN

## 2025-02-04 SDOH — ECONOMIC STABILITY: TRANSPORTATION INSECURITY
IN THE PAST 12 MONTHS, HAS LACK OF TRANSPORTATION KEPT YOU FROM MEETINGS, WORK, OR FROM GETTING THINGS NEEDED FOR DAILY LIVING?: NO

## 2025-02-04 SDOH — ECONOMIC STABILITY: TRANSPORTATION INSECURITY
IN THE PAST 12 MONTHS, HAS THE LACK OF TRANSPORTATION KEPT YOU FROM MEDICAL APPOINTMENTS OR FROM GETTING MEDICATIONS?: NO

## 2025-02-04 SDOH — HEALTH STABILITY: PHYSICAL HEALTH: ON AVERAGE, HOW MANY DAYS PER WEEK DO YOU ENGAGE IN MODERATE TO STRENUOUS EXERCISE (LIKE A BRISK WALK)?: 7 DAYS

## 2025-02-04 ASSESSMENT — LIFESTYLE VARIABLES
AUDIT-C TOTAL SCORE: 0
HOW MANY STANDARD DRINKS CONTAINING ALCOHOL DO YOU HAVE ON A TYPICAL DAY: PATIENT DOES NOT DRINK
HOW OFTEN DO YOU HAVE SIX OR MORE DRINKS ON ONE OCCASION: NEVER
HOW OFTEN DO YOU HAVE A DRINK CONTAINING ALCOHOL: NEVER
SKIP TO QUESTIONS 9-10: 1

## 2025-02-04 ASSESSMENT — EDINBURGH POSTNATAL DEPRESSION SCALE (EPDS)
I HAVE BEEN ANXIOUS OR WORRIED FOR NO GOOD REASON: NO, NOT AT ALL
I HAVE BLAMED MYSELF UNNECESSARILY WHEN THINGS WENT WRONG: NO, NEVER
I HAVE FELT SCARED OR PANICKY FOR NO GOOD REASON: NO, NOT AT ALL
I HAVE FELT SAD OR MISERABLE: NO, NOT AT ALL
TOTAL SCORE: 0
I HAVE BEEN ABLE TO LAUGH AND SEE THE FUNNY SIDE OF THINGS: AS MUCH AS I ALWAYS COULD
I HAVE BEEN SO UNHAPPY THAT I HAVE BEEN CRYING: NO, NEVER
I HAVE BEEN SO UNHAPPY THAT I HAVE HAD DIFFICULTY SLEEPING: NOT AT ALL
I HAVE LOOKED FORWARD WITH ENJOYMENT TO THINGS: AS MUCH AS I EVER DID
THE THOUGHT OF HARMING MYSELF HAS OCCURRED TO ME: NEVER
THINGS HAVE BEEN GETTING ON TOP OF ME: NO, I HAVE BEEN COPING AS WELL AS EVER

## 2025-02-04 ASSESSMENT — PATIENT HEALTH QUESTIONNAIRE - PHQ9
1. LITTLE INTEREST OR PLEASURE IN DOING THINGS: NOT AT ALL
2. FEELING DOWN, DEPRESSED OR HOPELESS: NOT AT ALL
SUM OF ALL RESPONSES TO PHQ9 QUESTIONS 1 AND 2: 0

## 2025-02-04 NOTE — LETTER
February 4, 2025     Patient: Neva Allen   YOB: 1985   Date of Visit: 2/4/2025       To Whom It May Concern:    Neva Allen was seen in my clinic on 2/3/2025 & 2/4/2025 at 11:00 am. Please excuse Neva for her absence from work on this day to make the appointment.    If you have any questions or concerns, please don't hesitate to call.         Sincerely,         Albina Harding MD        CC: No Recipients

## 2025-02-04 NOTE — PATIENT INSTRUCTIONS
Congratulations on your pregnancy!      Review the new OB information provided today.      Return to the office every 4 weeks in the beginning part of pregnancy.  Your visit will get close together as the pregnancy progresses.      A prescription for baby aspirin has been sent to your pharmacy.  Start taking 2 tablets daily take your decrease the risk of preeclampsia later on in pregnancy.      A refill of your prenatal vitamin has been sent to the pharmacy.  Take 1 tablet daily.      Follow-up with one of our Genetics Counselors.      Routine prenatal labs are being sent today.  Results should be available 24 to 48 hours after blood work has been drawn.  You may call the office and select option #2 to speak with the nurse to obtain the results.      Arrange to have a follow-up ultrasound performed to in 4 to 5 weeks.      Feel free to call the office with any problems, questions or concerns prior to your next scheduled visit.

## 2025-02-05 LAB
EST. AVERAGE GLUCOSE BLD GHB EST-MCNC: 100 MG/DL
HBA1C MFR BLD: 5.1 %
HCV AB SER QL: NONREACTIVE
HEMOGLOBIN A2: 2.5 % (ref 2–3.5)
HEMOGLOBIN A: 97.2 % (ref 95.8–98)
HEMOGLOBIN F: 0.3 % (ref 0–2)
HEMOGLOBIN IDENTIFICATION INTERPRETATION: NORMAL
HIV 1+2 AB+HIV1 P24 AG SERPL QL IA: NONREACTIVE
PATH REVIEW-HGB IDENTIFICATION: NORMAL

## 2025-02-10 ENCOUNTER — TELEPHONE (OUTPATIENT)
Dept: OBSTETRICS AND GYNECOLOGY | Facility: CLINIC | Age: 40
End: 2025-02-10
Payer: COMMERCIAL

## 2025-02-10 NOTE — PROGRESS NOTES
40 yo morbidly obese woman presents for a NOB visit of a desired pregnancy. She was recently seen in the ED with VB, but had a normal ramesh IUP with cardiac activity and a possible small subchorionic hemorrhage.     She denies any severe N/V or sx of PEC.     O: see physical     A/P: HROB - threatened AB, morbid obesity, AMA, prior CD, thyroid disease    -  PNL, plus TSH    -  Genetic Counseling referral     -  NT US     -  bASA d/w the patient and rx written     -  D/W the patient our practice    -  RTC Q 4 weeks     -  NOB packet of info given

## 2025-02-10 NOTE — TELEPHONE ENCOUNTER
RN called patient to discuss results  No answer at this time  RN will follow up with [employer] message  Voicemail left encouraging patient to call the office back.     Amena WILSONN, RN

## 2025-02-10 NOTE — TELEPHONE ENCOUNTER
----- Message from Nurse aSchi MORROW sent at 2/10/2025 10:13 AM EST -----    ----- Message -----  From: Albina Harding MD  Sent: 2/10/2025  12:27 AM EST  To: Do [facility] Clinical Support Staff    Needs Endocrinology follow up for thyroid diease in pregnancy.

## 2025-02-18 PROBLEM — R79.89 ABNORMAL TSH: Status: RESOLVED | Noted: 2023-05-23 | Resolved: 2025-02-18

## 2025-02-18 PROBLEM — Z34.90 ENCOUNTER FOR PREGNANCY RELATED EXAMINATION, ANTEPARTUM: Status: ACTIVE | Noted: 2025-02-18

## 2025-02-18 PROBLEM — O99.280 DISORDER OF THYROID, ANTEPARTUM (HHS-HCC): Status: ACTIVE | Noted: 2025-02-18

## 2025-02-18 PROBLEM — K80.20 CHOLELITHIASIS: Status: RESOLVED | Noted: 2023-05-23 | Resolved: 2025-02-18

## 2025-02-18 PROBLEM — O34.219 PREVIOUS CESAREAN DELIVERY AFFECTING PREGNANCY (HHS-HCC): Status: ACTIVE | Noted: 2025-02-18

## 2025-02-18 PROBLEM — O99.210 OBESITY IN PREGNANCY (HHS-HCC): Status: ACTIVE | Noted: 2025-02-18

## 2025-02-18 PROBLEM — E66.01 MORBID OBESITY WITH BODY MASS INDEX (BMI) OF 40.0 OR HIGHER (MULTI): Status: RESOLVED | Noted: 2023-05-23 | Resolved: 2025-02-18

## 2025-02-18 PROBLEM — E07.9 DISORDER OF THYROID, ANTEPARTUM (HHS-HCC): Status: ACTIVE | Noted: 2025-02-18

## 2025-02-19 ENCOUNTER — APPOINTMENT (OUTPATIENT)
Dept: OBSTETRICS AND GYNECOLOGY | Facility: CLINIC | Age: 40
End: 2025-02-19
Payer: COMMERCIAL

## 2025-02-19 VITALS — WEIGHT: 293 LBS | SYSTOLIC BLOOD PRESSURE: 138 MMHG | BODY MASS INDEX: 45.92 KG/M2 | DIASTOLIC BLOOD PRESSURE: 83 MMHG

## 2025-02-19 DIAGNOSIS — E07.9 DISORDER OF THYROID, ANTEPARTUM (HHS-HCC): ICD-10-CM

## 2025-02-19 DIAGNOSIS — Z34.90 ENCOUNTER FOR PREGNANCY RELATED EXAMINATION, ANTEPARTUM: Primary | ICD-10-CM

## 2025-02-19 DIAGNOSIS — O99.210 OBESITY IN PREGNANCY (HHS-HCC): ICD-10-CM

## 2025-02-19 DIAGNOSIS — O34.219 PREVIOUS CESAREAN DELIVERY AFFECTING PREGNANCY (HHS-HCC): ICD-10-CM

## 2025-02-19 DIAGNOSIS — O99.280 DISORDER OF THYROID, ANTEPARTUM (HHS-HCC): ICD-10-CM

## 2025-02-19 PROCEDURE — 0500F INITIAL PRENATAL CARE VISIT: CPT | Performed by: OBSTETRICS & GYNECOLOGY

## 2025-02-19 RX ORDER — LEVOTHYROXINE SODIUM 100 UG/1
100 TABLET ORAL DAILY
Qty: 30 TABLET | Refills: 11 | Status: SHIPPED | OUTPATIENT
Start: 2025-02-19 | End: 2026-02-19

## 2025-02-19 NOTE — PROGRESS NOTES
Ob Follow-up    SUBJECTIVE    HPI: Neva Allen is a 39 y.o.  at 9w3d here for RPNV.  Labs and previous notes were reviewed.  TSH was elevated, but synthroid has not been adjusted yet, has endocrinology appt in .        OBJECTIVE  Visit Vitals  /83   Wt 145 kg (320 lb)   LMP 12/15/2024   BMI 45.92 kg/m²   OB Status Pregnant   Smoking Status Never   BSA 2.68 m²      See OB flowsheet      ASSESSMENT & PLAN    Neva Allen is a 39 y.o.  at 9w3d here for the following concerns we addressed today:    Problem List Items Addressed This Visit          Ob-Gyn Problems    Previous  delivery affecting pregnancy ([facility])    Overview     2018 PLTCS for macrosomia         Obesity in pregnancy ([facility])    Overview     Early DM screen negative  US:  30w  36w  Weekly NSTs at 36w         Encounter for pregnancy related examination, antepartum - Primary    Overview       [] Accepts Blood Products:   [x] Initial BMI: 46  [] Prenatal Labs:   [x] Last pap: 2022 Negative, negative HPV  [] Genetic Screening:    [] Fetal Sex:   [x] Baby ASA: Yes  [x] Pregnancy dated by: LMP c/w 6w6d US    [] Anatomy US:   [] 1hr GCT at 24-28wks:  [] Rhogam:  [] Tdap Vaccine:  [] RSV (32-36 wks Sep-):   [] Flu Vaccine:    [] GBS at 36 - 37 wks:  [] Labor preferences:  [] 39 weeks discussion of IOL vs. Expectant management:  [] Mode of delivery ( anticipated ):   [] Breastfeeding:  [] Postpartum Birth control method:                     Other    Disorder of thyroid, antepartum ([facility])    Overview     On synthroid 75mcg  Check TSH:  1st Tri 4.26  2nd Tri  3rd Tri              No orders of the defined types were placed in this encounter.     Schedule early anatomy US  Desires cfDNA screen with sex chromosomes  Will have remaining prenatal labs drawn  RTC in 4 weeks      Jason Emery MD

## 2025-03-04 ENCOUNTER — LAB (OUTPATIENT)
Dept: LAB | Facility: HOSPITAL | Age: 40
End: 2025-03-04
Payer: COMMERCIAL

## 2025-03-04 LAB
ABO GROUP (TYPE) IN BLOOD: NORMAL
ANTIBODY SCREEN: NORMAL
RH FACTOR (ANTIGEN D): NORMAL

## 2025-03-04 PROCEDURE — 86901 BLOOD TYPING SEROLOGIC RH(D): CPT

## 2025-03-04 PROCEDURE — 86900 BLOOD TYPING SEROLOGIC ABO: CPT

## 2025-03-04 PROCEDURE — 86850 RBC ANTIBODY SCREEN: CPT

## 2025-03-05 ENCOUNTER — LAB (OUTPATIENT)
Dept: LAB | Facility: HOSPITAL | Age: 40
End: 2025-03-05
Payer: COMMERCIAL

## 2025-03-05 DIAGNOSIS — Z34.90 ENCOUNTER FOR SUPERVISION OF NORMAL PREGNANCY, UNSPECIFIED, UNSPECIFIED TRIMESTER: Primary | ICD-10-CM

## 2025-03-06 LAB
HBV SURFACE AG SERPL QL IA: NORMAL
RUBV IGG SERPL IA-ACNC: 3.85 INDEX
T PALLIDUM AB SER QL IA: NEGATIVE
T4 SERPL-MCNC: 10.3 MCG/DL (ref 5.1–11.9)

## 2025-03-07 ENCOUNTER — APPOINTMENT (OUTPATIENT)
Dept: RADIOLOGY | Facility: HOSPITAL | Age: 40
End: 2025-03-07
Payer: COMMERCIAL

## 2025-03-07 ENCOUNTER — HOSPITAL ENCOUNTER (OUTPATIENT)
Dept: RADIOLOGY | Facility: CLINIC | Age: 40
Discharge: HOME | End: 2025-03-07
Payer: COMMERCIAL

## 2025-03-07 ENCOUNTER — TELEPHONE (OUTPATIENT)
Dept: OBSTETRICS AND GYNECOLOGY | Facility: CLINIC | Age: 40
End: 2025-03-07
Payer: COMMERCIAL

## 2025-03-07 DIAGNOSIS — Z34.90 ENCOUNTER FOR PREGNANCY RELATED EXAMINATION, ANTEPARTUM: ICD-10-CM

## 2025-03-07 DIAGNOSIS — O99.210 OBESITY IN PREGNANCY (HHS-HCC): ICD-10-CM

## 2025-03-07 PROCEDURE — 76801 OB US < 14 WKS SINGLE FETUS: CPT

## 2025-03-07 PROCEDURE — 76817 TRANSVAGINAL US OBSTETRIC: CPT

## 2025-03-07 NOTE — TELEPHONE ENCOUNTER
Nvea Allen called in because she has further questions about her ultrasound results. Patient requested to speak with Dr. Emery but was informed that she is out of the office today. Patient would like a call back at [phone].    Sudha Saxena MA

## 2025-03-07 NOTE — TELEPHONE ENCOUNTER
Called patient to discuss missed AB and next steps  Identified by name and   Empathized with patient over her loss and discussed options and appointments   Scheduled patient for  at 10:15 am with Dr. Owens at [facility].  Discussed bleeding/miscarriage precautions for ED  Patient verbalized understanding, all questions/concerns addressed at this time.    STEVE ParishN RN

## 2025-03-10 ENCOUNTER — PREP FOR PROCEDURE (OUTPATIENT)
Facility: CLINIC | Age: 40
End: 2025-03-10

## 2025-03-10 ENCOUNTER — TELEPHONE (OUTPATIENT)
Facility: CLINIC | Age: 40
End: 2025-03-10

## 2025-03-10 ENCOUNTER — APPOINTMENT (OUTPATIENT)
Facility: CLINIC | Age: 40
End: 2025-03-10
Payer: COMMERCIAL

## 2025-03-10 VITALS
SYSTOLIC BLOOD PRESSURE: 125 MMHG | BODY MASS INDEX: 41.95 KG/M2 | HEIGHT: 70 IN | DIASTOLIC BLOOD PRESSURE: 84 MMHG | WEIGHT: 293 LBS

## 2025-03-10 DIAGNOSIS — O02.1 MISSED ABORTION (HHS-HCC): Primary | ICD-10-CM

## 2025-03-10 PROBLEM — Z34.90 ENCOUNTER FOR PREGNANCY RELATED EXAMINATION, ANTEPARTUM: Status: RESOLVED | Noted: 2025-02-18 | Resolved: 2025-03-10

## 2025-03-10 PROBLEM — O99.280 DISORDER OF THYROID, ANTEPARTUM (HHS-HCC): Status: RESOLVED | Noted: 2025-02-18 | Resolved: 2025-03-10

## 2025-03-10 PROBLEM — E07.9 DISORDER OF THYROID, ANTEPARTUM (HHS-HCC): Status: RESOLVED | Noted: 2025-02-18 | Resolved: 2025-03-10

## 2025-03-10 PROBLEM — O99.210 OBESITY IN PREGNANCY (HHS-HCC): Status: RESOLVED | Noted: 2025-02-18 | Resolved: 2025-03-10

## 2025-03-10 PROCEDURE — 1036F TOBACCO NON-USER: CPT | Performed by: OBSTETRICS & GYNECOLOGY

## 2025-03-10 PROCEDURE — 3008F BODY MASS INDEX DOCD: CPT | Performed by: OBSTETRICS & GYNECOLOGY

## 2025-03-10 PROCEDURE — 99214 OFFICE O/P EST MOD 30 MIN: CPT | Performed by: OBSTETRICS & GYNECOLOGY

## 2025-03-10 RX ORDER — CELECOXIB 400 MG/1
400 CAPSULE ORAL ONCE
Status: CANCELLED | OUTPATIENT
Start: 2025-03-10 | End: 2025-03-10

## 2025-03-10 RX ORDER — GABAPENTIN 600 MG/1
600 TABLET ORAL ONCE
Status: CANCELLED | OUTPATIENT
Start: 2025-03-10 | End: 2025-03-10

## 2025-03-10 RX ORDER — MISOPROSTOL 200 UG/1
800 TABLET ORAL ONCE
Qty: 8 TABLET | Refills: 0 | Status: SHIPPED | OUTPATIENT
Start: 2025-03-10 | End: 2025-03-10

## 2025-03-10 RX ORDER — ACETAMINOPHEN 325 MG/1
975 TABLET ORAL ONCE
Status: CANCELLED | OUTPATIENT
Start: 2025-03-10 | End: 2025-03-10

## 2025-03-10 NOTE — PROGRESS NOTES
Neva Allen is a 39 y.o. female who presents with a chief complaint of Follow-up (Patient had MAB and here to discuss what to do next )      SUBJECTIVE  Patient presents after being diagnosed with a missed AB when she went in for her nuchal ultrasound.  This shows her pregnancy about 7 weeks and 4 days.  It had a heartbeat in her prior ultrasound.  We went over different ways of managing this including surgical and medical.  Organ to try to use Cytotec to induce passage of the products of conception.  Went over the risks and the benefits.    Past Medical History:   Diagnosis Date    Encounter for  screening for Streptococcus B     Screening, , for Streptococcus B    Encounter for screening for diabetes mellitus 2019    Diabetes mellitus screening    Encounter for screening for other disorder     Screening for blood or protein in urine    Encounter for supervision of normal pregnancy, unspecified, unspecified trimester 2019    Encounter for supervision of normal pregnancy    Exceptionally large  baby ([facility]) 2019    Macrosomia    Other conditions influencing health status 2019    History of cough    Personal history of other diseases of the respiratory system 2019    History of influenza    Personal history of other medical treatment 10/03/2019    History of removal of staples     Past Surgical History:   Procedure Laterality Date    CHOLECYSTECTOMY      OTHER SURGICAL HISTORY  2019    Dilation and curettage    OTHER SURGICAL HISTORY  10/03/2019     section low transverse     Social History     Socioeconomic History    Marital status: Significant Other     Spouse name: Kimberlee   Occupational History    Occupation: Mental Health Therapist   Tobacco Use    Smoking status: Never     Passive exposure: Never    Smokeless tobacco: Never   Substance and Sexual Activity    Alcohol use: Not Currently     Comment: social    Drug use: Never    Sexual activity:  Yes     Partners: Male     Birth control/protection: None     Social Drivers of Health     Financial Resource Strain: Low Risk  (5/24/2024)    Received from [facility]    Overall Financial Resource Strain (CARDIA)     Difficulty of Paying Living Expenses: Not hard at all   Food Insecurity: No Food Insecurity (2/4/2025)    Hunger Vital Sign     Worried About Running Out of Food in the Last Year: Never true     Ran Out of Food in the Last Year: Never true   Transportation Needs: No Transportation Needs (2/4/2025)    PRAPARE - Transportation     Lack of Transportation (Medical): No     Lack of Transportation (Non-Medical): No   Physical Activity: Sufficiently Active (2/4/2025)    Exercise Vital Sign     Days of Exercise per Week: 7 days     Minutes of Exercise per Session: 30 min   Stress: No Stress Concern Present (2/4/2025)    [place] [employer] - Occupational Stress Questionnaire     Feeling of Stress : Not at all   Social Connections: Moderately Isolated (5/24/2024)    Received from [facility]    Social Connection and Isolation Panel [NHANES]     Frequency of Communication with Friends and Family: More than three times a week     Frequency of Social Gatherings with Friends and Family: Once a week     Attends Baptism Services: More than 4 times per year     Active Member of Clubs or Organizations: No     Attends Club or Organization Meetings: Never     Marital Status: Never    Intimate Partner Violence: Not At Risk (2/4/2025)    Humiliation, Afraid, Rape, and Kick questionnaire     Fear of Current or Ex-Partner: No     Emotionally Abused: No     Physically Abused: No     Sexually Abused: No   Housing Stability: Low Risk  (5/24/2024)    Received from [facility]    Housing Stability Vital Sign     Unable to Pay for Housing in the Last Year: No     Number of Places Lived in the Last Year: 1     Unstable Housing in the  "Last Year: No     Family History   Problem Relation Name Age of Onset    Asthma Mother      Hypertension Father      Diabetes Father         OB History    Para Term  AB Living   3 1 1   2 1   SAB IAB Ectopic Multiple Live Births   1 1     1      # Outcome Date GA Lbr Carson/2nd Weight Sex Type Anes PTL Lv   3 SAB            2 Term 18 39w0d   M CS-LTranv EPI N KIM   1 IAB  8w0d              OBJECTIVE  Allergies   Allergen Reactions    House Dust Dermatitis    Mold Cough    Sulfamethoxazole Hives    Cefadroxil Rash    Penicillins Rash and Hives    Sulfamethoxazole-Trimethoprim Rash and Other      (Not in a hospital admission)       Review of Systems  History obtained from the patient  General ROS: negative  Psychological ROS: negative  Gastrointestinal ROS: no abdominal pain, change in bowel habits, or black or bloody stools  Musculoskeletal ROS: negative  Physical Exam  General Appearance: awake, alert, oriented, in no acute distress, well developed, well nourished, and in no acute distress  Skin: there are no suspicious lesions or rashes of concern, skin color, texture, turgor are normal; there are no bruises, rashes or lesions.  Head/Face: NCAT  Eyes: No gross abnormalities., PERRL, and EOMI  Neck: neck- supple, no mass, non-tender  Back: no pain to palpation  Abdomen: Soft, non-tender, normal bowel sounds; no bruits, organomegaly or masses.  Extremities: Extremities warm to touch, pink, with no edema.  Musculoskeletal: negative    /84   Ht 1.778 m (5' 10\")   Wt 147 kg (324 lb 9.6 oz)   LMP 12/15/2024   Breastfeeding Unknown   BMI 46.58 kg/m²    Problem List Items Addressed This Visit    None  Visit Diagnoses       Missed  ([facility])    -  Primary    Relevant Medications    miSOPROStoL (Cytotec) 200 mcg tablet    Other Relevant Orders    US PELVIS TRANSABDOMINAL WITH TRANSVAGINAL        Ultrasound and follow-up on Thursday to see if there is passage of products of " conception

## 2025-03-10 NOTE — H&P (VIEW-ONLY)
Neva Allen is a 39 y.o. female who presents with a chief complaint of Follow-up (Patient had MAB and here to discuss what to do next )      SUBJECTIVE  Patient presents after being diagnosed with a missed AB when she went in for her nuchal ultrasound.  This shows her pregnancy about 7 weeks and 4 days.  It had a heartbeat in her prior ultrasound.  We went over different ways of managing this including surgical and medical.  Organ to try to use Cytotec to induce passage of the products of conception.  Went over the risks and the benefits.    Past Medical History:   Diagnosis Date    Encounter for  screening for Streptococcus B     Screening, , for Streptococcus B    Encounter for screening for diabetes mellitus 2019    Diabetes mellitus screening    Encounter for screening for other disorder     Screening for blood or protein in urine    Encounter for supervision of normal pregnancy, unspecified, unspecified trimester 2019    Encounter for supervision of normal pregnancy    Exceptionally large  baby ([facility]) 2019    Macrosomia    Other conditions influencing health status 2019    History of cough    Personal history of other diseases of the respiratory system 2019    History of influenza    Personal history of other medical treatment 10/03/2019    History of removal of staples     Past Surgical History:   Procedure Laterality Date    CHOLECYSTECTOMY      OTHER SURGICAL HISTORY  2019    Dilation and curettage    OTHER SURGICAL HISTORY  10/03/2019     section low transverse     Social History     Socioeconomic History    Marital status: Significant Other     Spouse name: Kimberlee   Occupational History    Occupation: Mental Health Therapist   Tobacco Use    Smoking status: Never     Passive exposure: Never    Smokeless tobacco: Never   Substance and Sexual Activity    Alcohol use: Not Currently     Comment: social    Drug use: Never    Sexual activity:  Yes     Partners: Male     Birth control/protection: None     Social Drivers of Health     Financial Resource Strain: Low Risk  (5/24/2024)    Received from [facility]    Overall Financial Resource Strain (CARDIA)     Difficulty of Paying Living Expenses: Not hard at all   Food Insecurity: No Food Insecurity (2/4/2025)    Hunger Vital Sign     Worried About Running Out of Food in the Last Year: Never true     Ran Out of Food in the Last Year: Never true   Transportation Needs: No Transportation Needs (2/4/2025)    PRAPARE - Transportation     Lack of Transportation (Medical): No     Lack of Transportation (Non-Medical): No   Physical Activity: Sufficiently Active (2/4/2025)    Exercise Vital Sign     Days of Exercise per Week: 7 days     Minutes of Exercise per Session: 30 min   Stress: No Stress Concern Present (2/4/2025)    [place] [employer]ational [employer] - Occupational Stress Questionnaire     Feeling of Stress : Not at all   Social Connections: Moderately Isolated (5/24/2024)    Received from [facility]    Social Connection and Isolation Panel [NHANES]     Frequency of Communication with Friends and Family: More than three times a week     Frequency of Social Gatherings with Friends and Family: Once a week     Attends Orthodox Services: More than 4 times per year     Active Member of Clubs or Organizations: No     Attends Club or Organization Meetings: Never     Marital Status: Never    Intimate Partner Violence: Not At Risk (2/4/2025)    Humiliation, Afraid, Rape, and Kick questionnaire     Fear of Current or Ex-Partner: No     Emotionally Abused: No     Physically Abused: No     Sexually Abused: No   Housing Stability: Low Risk  (5/24/2024)    Received from [facility]    Housing Stability Vital Sign     Unable to Pay for Housing in the Last Year: No     Number of Places Lived in the Last Year: 1     Unstable Housing in the  "Last Year: No     Family History   Problem Relation Name Age of Onset    Asthma Mother      Hypertension Father      Diabetes Father         OB History    Para Term  AB Living   3 1 1   2 1   SAB IAB Ectopic Multiple Live Births   1 1     1      # Outcome Date GA Lbr Carson/2nd Weight Sex Type Anes PTL Lv   3 SAB            2 Term 18 39w0d   M CS-LTranv EPI N KIM   1 IAB  8w0d              OBJECTIVE  Allergies   Allergen Reactions    House Dust Dermatitis    Mold Cough    Sulfamethoxazole Hives    Cefadroxil Rash    Penicillins Rash and Hives    Sulfamethoxazole-Trimethoprim Rash and Other      (Not in a hospital admission)       Review of Systems  History obtained from the patient  General ROS: negative  Psychological ROS: negative  Gastrointestinal ROS: no abdominal pain, change in bowel habits, or black or bloody stools  Musculoskeletal ROS: negative  Physical Exam  General Appearance: awake, alert, oriented, in no acute distress, well developed, well nourished, and in no acute distress  Skin: there are no suspicious lesions or rashes of concern, skin color, texture, turgor are normal; there are no bruises, rashes or lesions.  Head/Face: NCAT  Eyes: No gross abnormalities., PERRL, and EOMI  Neck: neck- supple, no mass, non-tender  Back: no pain to palpation  Abdomen: Soft, non-tender, normal bowel sounds; no bruits, organomegaly or masses.  Extremities: Extremities warm to touch, pink, with no edema.  Musculoskeletal: negative    /84   Ht 1.778 m (5' 10\")   Wt 147 kg (324 lb 9.6 oz)   LMP 12/15/2024   Breastfeeding Unknown   BMI 46.58 kg/m²    Problem List Items Addressed This Visit    None  Visit Diagnoses       Missed  ([facility])    -  Primary    Relevant Medications    miSOPROStoL (Cytotec) 200 mcg tablet    Other Relevant Orders    US PELVIS TRANSABDOMINAL WITH TRANSVAGINAL        Ultrasound and follow-up on Thursday to see if there is passage of products of " conception

## 2025-03-13 ENCOUNTER — APPOINTMENT (OUTPATIENT)
Dept: RADIOLOGY | Facility: HOSPITAL | Age: 40
End: 2025-03-13
Payer: COMMERCIAL

## 2025-03-17 ENCOUNTER — ANESTHESIA (OUTPATIENT)
Dept: OPERATING ROOM | Facility: HOSPITAL | Age: 40
End: 2025-03-17
Payer: COMMERCIAL

## 2025-03-17 ENCOUNTER — HOSPITAL ENCOUNTER (OUTPATIENT)
Facility: HOSPITAL | Age: 40
Setting detail: OUTPATIENT SURGERY
Discharge: HOME | End: 2025-03-17
Attending: OBSTETRICS & GYNECOLOGY | Admitting: OBSTETRICS & GYNECOLOGY
Payer: COMMERCIAL

## 2025-03-17 ENCOUNTER — ANESTHESIA EVENT (OUTPATIENT)
Dept: OPERATING ROOM | Facility: HOSPITAL | Age: 40
End: 2025-03-17
Payer: COMMERCIAL

## 2025-03-17 VITALS
SYSTOLIC BLOOD PRESSURE: 134 MMHG | RESPIRATION RATE: 22 BRPM | WEIGHT: 293 LBS | BODY MASS INDEX: 41.95 KG/M2 | OXYGEN SATURATION: 97 % | TEMPERATURE: 97.7 F | DIASTOLIC BLOOD PRESSURE: 86 MMHG | HEIGHT: 70 IN | HEART RATE: 79 BPM

## 2025-03-17 DIAGNOSIS — O02.1 MISSED ABORTION (HHS-HCC): Primary | ICD-10-CM

## 2025-03-17 PROBLEM — K21.9 GASTROESOPHAGEAL REFLUX DISEASE: Status: ACTIVE | Noted: 2025-03-17

## 2025-03-17 PROBLEM — E03.9 HYPOTHYROIDISM: Status: ACTIVE | Noted: 2025-03-17

## 2025-03-17 PROBLEM — E66.9 OBESITY: Status: ACTIVE | Noted: 2025-03-17

## 2025-03-17 PROCEDURE — 2500000001 HC RX 250 WO HCPCS SELF ADMINISTERED DRUGS (ALT 637 FOR MEDICARE OP): Performed by: OBSTETRICS & GYNECOLOGY

## 2025-03-17 PROCEDURE — 7100000002 HC RECOVERY ROOM TIME - EACH INCREMENTAL 1 MINUTE: Performed by: OBSTETRICS & GYNECOLOGY

## 2025-03-17 PROCEDURE — 3700000002 HC GENERAL ANESTHESIA TIME - EACH INCREMENTAL 1 MINUTE: Performed by: OBSTETRICS & GYNECOLOGY

## 2025-03-17 PROCEDURE — 3700000001 HC GENERAL ANESTHESIA TIME - INITIAL BASE CHARGE: Performed by: OBSTETRICS & GYNECOLOGY

## 2025-03-17 PROCEDURE — 2500000005 HC RX 250 GENERAL PHARMACY W/O HCPCS

## 2025-03-17 PROCEDURE — 88305 TISSUE EXAM BY PATHOLOGIST: CPT | Mod: TC,AHULAB | Performed by: OBSTETRICS & GYNECOLOGY

## 2025-03-17 PROCEDURE — 7100000010 HC PHASE TWO TIME - EACH INCREMENTAL 1 MINUTE: Performed by: OBSTETRICS & GYNECOLOGY

## 2025-03-17 PROCEDURE — A59820 PR SURG RX MISSED ABORTN,1ST TRI: Performed by: NURSE ANESTHETIST, CERTIFIED REGISTERED

## 2025-03-17 PROCEDURE — 2500000004 HC RX 250 GENERAL PHARMACY W/ HCPCS (ALT 636 FOR OP/ED)

## 2025-03-17 PROCEDURE — 7100000009 HC PHASE TWO TIME - INITIAL BASE CHARGE: Performed by: OBSTETRICS & GYNECOLOGY

## 2025-03-17 PROCEDURE — A59820 PR SURG RX MISSED ABORTN,1ST TRI: Performed by: ANESTHESIOLOGY

## 2025-03-17 PROCEDURE — 3600000003 HC OR TIME - INITIAL BASE CHARGE - PROCEDURE LEVEL THREE: Performed by: OBSTETRICS & GYNECOLOGY

## 2025-03-17 PROCEDURE — 3600000008 HC OR TIME - EACH INCREMENTAL 1 MINUTE - PROCEDURE LEVEL THREE: Performed by: OBSTETRICS & GYNECOLOGY

## 2025-03-17 PROCEDURE — 7100000001 HC RECOVERY ROOM TIME - INITIAL BASE CHARGE: Performed by: OBSTETRICS & GYNECOLOGY

## 2025-03-17 RX ORDER — GABAPENTIN 300 MG/1
600 CAPSULE ORAL ONCE
Status: COMPLETED | OUTPATIENT
Start: 2025-03-17 | End: 2025-03-17

## 2025-03-17 RX ORDER — ONDANSETRON HYDROCHLORIDE 2 MG/ML
4 INJECTION, SOLUTION INTRAVENOUS ONCE AS NEEDED
Status: CANCELLED | OUTPATIENT
Start: 2025-03-17

## 2025-03-17 RX ORDER — ONDANSETRON HYDROCHLORIDE 2 MG/ML
INJECTION, SOLUTION INTRAVENOUS AS NEEDED
Status: DISCONTINUED | OUTPATIENT
Start: 2025-03-17 | End: 2025-03-17

## 2025-03-17 RX ORDER — DOXYCYCLINE 100 MG/10ML
INJECTION, POWDER, LYOPHILIZED, FOR SOLUTION INTRAVENOUS AS NEEDED
Status: DISCONTINUED | OUTPATIENT
Start: 2025-03-17 | End: 2025-03-17

## 2025-03-17 RX ORDER — MIDAZOLAM HYDROCHLORIDE 1 MG/ML
INJECTION INTRAMUSCULAR; INTRAVENOUS AS NEEDED
Status: DISCONTINUED | OUTPATIENT
Start: 2025-03-17 | End: 2025-03-17

## 2025-03-17 RX ORDER — SODIUM CHLORIDE, SODIUM LACTATE, POTASSIUM CHLORIDE, CALCIUM CHLORIDE 600; 310; 30; 20 MG/100ML; MG/100ML; MG/100ML; MG/100ML
50 INJECTION, SOLUTION INTRAVENOUS CONTINUOUS
Status: CANCELLED | OUTPATIENT
Start: 2025-03-17 | End: 2025-03-17

## 2025-03-17 RX ORDER — IBUPROFEN 600 MG/1
600 TABLET ORAL EVERY 6 HOURS PRN
Qty: 20 TABLET | Refills: 0 | Status: SHIPPED | OUTPATIENT
Start: 2025-03-17 | End: 2025-03-27

## 2025-03-17 RX ORDER — CELECOXIB 200 MG/1
400 CAPSULE ORAL ONCE
Status: COMPLETED | OUTPATIENT
Start: 2025-03-17 | End: 2025-03-17

## 2025-03-17 RX ORDER — ALBUTEROL SULFATE 0.83 MG/ML
2.5 SOLUTION RESPIRATORY (INHALATION) ONCE AS NEEDED
Status: CANCELLED | OUTPATIENT
Start: 2025-03-17

## 2025-03-17 RX ORDER — ACETAMINOPHEN 325 MG/1
975 TABLET ORAL ONCE
Status: COMPLETED | OUTPATIENT
Start: 2025-03-17 | End: 2025-03-17

## 2025-03-17 RX ORDER — DEXMEDETOMIDINE IN 0.9 % NACL 20 MCG/5ML
SYRINGE (ML) INTRAVENOUS AS NEEDED
Status: DISCONTINUED | OUTPATIENT
Start: 2025-03-17 | End: 2025-03-17

## 2025-03-17 RX ORDER — PROPOFOL 10 MG/ML
INJECTION, EMULSION INTRAVENOUS CONTINUOUS PRN
Status: DISCONTINUED | OUTPATIENT
Start: 2025-03-17 | End: 2025-03-17

## 2025-03-17 RX ORDER — ACETAMINOPHEN 325 MG/1
650 TABLET ORAL EVERY 6 HOURS PRN
Qty: 20 TABLET | Refills: 0 | Status: SHIPPED | OUTPATIENT
Start: 2025-03-17 | End: 2025-03-27

## 2025-03-17 RX ORDER — OXYCODONE HYDROCHLORIDE 5 MG/1
5 TABLET ORAL EVERY 4 HOURS PRN
Status: CANCELLED | OUTPATIENT
Start: 2025-03-17

## 2025-03-17 RX ORDER — FENTANYL CITRATE 50 UG/ML
INJECTION, SOLUTION INTRAMUSCULAR; INTRAVENOUS AS NEEDED
Status: DISCONTINUED | OUTPATIENT
Start: 2025-03-17 | End: 2025-03-17

## 2025-03-17 RX ADMIN — ACETAMINOPHEN 975 MG: 325 TABLET, FILM COATED ORAL at 10:25

## 2025-03-17 RX ADMIN — GLYCOPYRROLATE 0.2 MCG: 0.2 INJECTION, SOLUTION INTRAMUSCULAR; INTRAVENOUS at 12:26

## 2025-03-17 RX ADMIN — MIDAZOLAM HYDROCHLORIDE 2 MG: 1 INJECTION, SOLUTION INTRAMUSCULAR; INTRAVENOUS at 12:26

## 2025-03-17 RX ADMIN — FENTANYL CITRATE 50 MCG: 50 INJECTION, SOLUTION INTRAMUSCULAR; INTRAVENOUS at 12:33

## 2025-03-17 RX ADMIN — PROPOFOL 75 MCG/KG/MIN: 10 INJECTION, EMULSION INTRAVENOUS at 12:31

## 2025-03-17 RX ADMIN — FENTANYL CITRATE 25 MCG: 50 INJECTION, SOLUTION INTRAMUSCULAR; INTRAVENOUS at 12:36

## 2025-03-17 RX ADMIN — FENTANYL CITRATE 25 MCG: 50 INJECTION, SOLUTION INTRAMUSCULAR; INTRAVENOUS at 12:39

## 2025-03-17 RX ADMIN — Medication 30 MG: at 12:33

## 2025-03-17 RX ADMIN — GABAPENTIN 600 MG: 300 CAPSULE ORAL at 10:25

## 2025-03-17 RX ADMIN — SODIUM CHLORIDE: 9 INJECTION, SOLUTION INTRAVENOUS at 12:28

## 2025-03-17 RX ADMIN — ONDANSETRON 4 MG: 2 INJECTION, SOLUTION INTRAMUSCULAR; INTRAVENOUS at 12:36

## 2025-03-17 RX ADMIN — DOXYCYCLINE 200 MG: 100 INJECTION, POWDER, LYOPHILIZED, FOR SOLUTION INTRAVENOUS at 12:36

## 2025-03-17 RX ADMIN — Medication 12 MCG: at 12:32

## 2025-03-17 RX ADMIN — Medication 8 MCG: at 12:36

## 2025-03-17 RX ADMIN — DEXAMETHASONE SODIUM PHOSPHATE 4 MG: 4 INJECTION, SOLUTION INTRAMUSCULAR; INTRAVENOUS at 12:36

## 2025-03-17 RX ADMIN — CELECOXIB 400 MG: 200 CAPSULE ORAL at 10:25

## 2025-03-17 SDOH — HEALTH STABILITY: MENTAL HEALTH: CURRENT SMOKER: 0

## 2025-03-17 ASSESSMENT — PAIN SCALES - GENERAL
PAINLEVEL_OUTOF10: 0 - NO PAIN
PAINLEVEL_OUTOF10: 3
PAINLEVEL_OUTOF10: 0 - NO PAIN

## 2025-03-17 ASSESSMENT — PAIN - FUNCTIONAL ASSESSMENT
PAIN_FUNCTIONAL_ASSESSMENT: 0-10

## 2025-03-17 ASSESSMENT — PAIN DESCRIPTION - DESCRIPTORS: DESCRIPTORS: CRAMPING

## 2025-03-17 NOTE — OP NOTE
Suction D&C Operative Note     Date: 3/17/2025  OR Location: [facility] OR    Name: Neva Allen : 1985, Age: 39 y.o., MRN: [id number], Sex: female    Diagnosis  Pre-op Diagnosis      * Missed  (HHS-HCC) [O02.1] Post-op Diagnosis     * Missed  (HHS-HCC) [O02.1]     Procedures  Suction D&C  97584 - [place] MISSED  FIRST TRIMESTER SURGICAL      Surgeons      * Henri Owens - Primary    Resident/Fellow/Other Assistant:  Surgeons and Role:  * No surgeons found with a matching role *    Staff:   Circulator: Deirdre Rizzo Person: Roseanna Shea Scrub: Paula    Anesthesia Staff: Anesthesiologist: Jamison Heart DO  CRNA: ONEAL Crystal-JOSH  SRNA: Jovita Auguste    Procedure Summary  Anesthesia: Monitor Anesthesia Care  ASA: III  Estimated Blood Loss: mL  Intra-op Medications:   Administrations occurring from 1130 to 1230 on 25:   Medication Name Total Dose   glycopyrrolate PF (Robinul) injection 0.2 mcg   midazolam PF (Versed) injection 1 mg/mL 2 mg   NaCl 0.9 % bolus Cannot be calculated              Anesthesia Record               Intraprocedure I/O Totals       None           Specimen:   ID Type Source Tests Collected by Time   1 : PRODUCTS OF CONCEPTION Tissue PRODUCTS OF CONCEPTION SURGICAL PATHOLOGY EXAM Henri Owens DO 3/17/2025 1236                 Drains and/or Catheters: * None in log *    Tourniquet Times:         Implants:     Findings:     Indications: Neva Allen is an 39 y.o. female who is having surgery for Missed  (HHS-HCC) [O02.1].     The patient was seen in the preoperative area. The risks, benefits, complications, treatment options, non-operative alternatives, expected recovery and outcomes were discussed with the patient. The possibilities of reaction to medication, pulmonary aspiration, injury to surrounding structures, bleeding, recurrent infection, the need for additional procedures, failure to diagnose a condition, and creating a complication  requiring transfusion or operation were discussed with the patient. The patient concurred with the proposed plan, giving informed consent.  The site of surgery was properly noted/marked if necessary per policy. The patient has been actively warmed in preoperative area. Preoperative antibiotics have been ordered and given within 1 hours of incision. Venous thrombosis prophylaxis have been ordered including bilateral sequential compression devices    Procedure Details: Patient in the operative suite she was prepped and draped in normal sterile fashion placed patient in sinus and the abdomen was placed in until the cervix cervix was dilated to accommodate an 8 [place] suction curette  Utilizing that a suction D&C was done without difficulty.  All products were sent to pathology.  The patient had minimal bleeding after the procedure  Complications:  None; patient tolerated the procedure well.    Disposition: PACU - hemodynamically stable.  Condition: stable                 Additional Details:     Attending Attestation: I was present and scrubbed for the entire procedure.    Henri Owens  Phone Number: [phone]

## 2025-03-17 NOTE — ANESTHESIA PREPROCEDURE EVALUATION
Patient: Neva Allen    Procedure Information       Date/Time: 25 1130    Procedure: Suction D&C (Pelvis)    [facility]: [facility] /  [facility]    Surgeons: Henri Owens, DO            Relevant Problems   Anesthesia (within normal limits)      Cardiac (within normal limits)      Pulmonary (within normal limits)      Neuro (within normal limits)      GI   (+) Gastroesophageal reflux disease      /Renal (within normal limits)      Liver (within normal limits)      Endocrine   (+) Hypothyroidism   (+) Obesity      Musculoskeletal  Back pain with sciatica (Left leg)     Vitals:    25 1021   BP: 133/76   Pulse: 81   Resp: 18   Temp: 36.2 °C (97.2 °F)   SpO2: 95%       Past Surgical History:   Procedure Laterality Date    CHOLECYSTECTOMY      OTHER SURGICAL HISTORY  2019    Dilation and curettage    OTHER SURGICAL HISTORY  10/03/2019     section low transverse     Past Medical History:   Diagnosis Date    Encounter for  screening for Streptococcus B     Screening, , for Streptococcus B    Encounter for screening for diabetes mellitus 2019    Diabetes mellitus screening    Encounter for screening for other disorder     Screening for blood or protein in urine    Encounter for supervision of normal pregnancy, unspecified, unspecified trimester 2019    Encounter for supervision of normal pregnancy    Exceptionally large  baby ([facility]) 2019    Macrosomia    Hypothyroidism     Other conditions influencing health status 2019    History of cough    Personal history of other diseases of the respiratory system 2019    History of influenza    Personal history of other medical treatment 10/03/2019    History of removal of staples     No current facility-administered medications for this encounter.  Prior to Admission medications    Medication Sig Start Date End Date Taking? Authorizing Provider   levothyroxine (Synthroid) 100 mcg tablet Take 1  tablet (100 mcg) by mouth early in the morning.. Take on an empty stomach at the same time each day, either 30 to 60 minutes prior to breakfast 2/19/25 2/19/26 Yes Jason Emery MD   cyclobenzaprine (Flexeril) 10 mg tablet TAKE 1/2 TO 1 TABLET BY MOUTH TWICE DAILY AS NEEDED  Patient not taking: Reported on 2/4/2025 5/23/23   Elsa Vieira MD   miSOPROStoL (Cytotec) 200 mcg tablet Insert 4 tablets (800 mcg) into the vagina 1 time for 1 dose. Insert 4 tablets high into vagina once. May repeat dose 3 days later if needed. 3/10/25 3/10/25  Henri Owens DO   predniSONE (Deltasone) 20 mg tablet TAKE 3 TABLETS BY MOUTH DAILY FOR 3 DAYS, THEN 2 TABLETS DAILY X3 DAYS, THEN 1 TABLET DAILY X3 DAYS  Patient not taking: Reported on 2/4/2025 5/26/23   Elsa Vieira MD     Allergies   Allergen Reactions    House Dust Dermatitis    Mold Cough    Cefadroxil Rash    Penicillins Rash and Hives    Sulfamethoxazole Hives    Sulfamethoxazole-Trimethoprim Rash and Other     Social History     Tobacco Use    Smoking status: Never     Passive exposure: Never    Smokeless tobacco: Never   Substance Use Topics    Alcohol use: Not Currently     Comment: social         Chemistry    Lab Results   Component Value Date/Time     02/02/2025 1609    K 3.9 02/02/2025 1609     02/02/2025 1609    CO2 24 02/02/2025 1609    BUN 8 02/02/2025 1609    CREATININE 0.74 02/02/2025 1609    Lab Results   Component Value Date/Time    CALCIUM 9.0 02/02/2025 1609    ALKPHOS 56 02/02/2025 1609    AST 15 02/02/2025 1609    ALT 13 02/02/2025 1609    BILITOT 0.7 02/02/2025 1609          Lab Results   Component Value Date/Time    WBC 14.6 (H) 02/02/2025 1450    HGB 14.2 02/02/2025 1450    HCT 42.8 02/02/2025 1450     02/02/2025 1450     Lab Results   Component Value Date/Time    PROTIME 13.7 (H) 02/02/2025 1609    INR 1.2 (H) 02/02/2025 1609       Clinical information reviewed:   Tobacco  Allergies  Meds   Med Hx  Surg Hx  OB Status  Fam  Hx  Soc   Hx        NPO Detail:  NPO/Void Status  Carbohydrate Drink Given Prior to Surgery? : Y  Date of Last Liquid: 03/17/25  Time of Last Liquid: 0750  Date of Last Solid: 03/16/25  Time of Last Solid: 1700  Last Intake Type: Clear fluids  Time of Last Void: 1018         Physical Exam    Airway  Mallampati: III  TM distance: >3 FB  Neck ROM: full     Cardiovascular   Rhythm: regular  Rate: normal     Dental        Pulmonary   Breath sounds clear to auscultation     Abdominal            Anesthesia Plan    History of general anesthesia?: yes  History of complications of general anesthesia?: no    ASA 3     MAC   (IV sedat)  The patient is not a current smoker.    Anesthetic plan and risks discussed with patient.  Use of blood products discussed with patient who consented to blood products.    Plan discussed with CRNA.

## 2025-03-17 NOTE — ANESTHESIA POSTPROCEDURE EVALUATION
Patient: Neva Allen    Procedure Summary       Date: 25 Room / Location: [facility]    Anesthesia Start: 1228 Anesthesia Stop: 1258    Procedure: Suction D&C (Pelvis) Diagnosis:       Missed  ([facility])      (Missed  ([facility]-HCC) [O02.1])    Surgeons: Henri Owens DO Responsible Provider: Jamison Heart DO    Anesthesia Type: MAC ASA Status: 3            Anesthesia Type: MAC    Vitals Value Taken Time   /80 25 1300   Temp 36.2 °C (97.2 °F) 25 1252   Pulse 85 25 1324   Resp 18 25 1300   SpO2 96 % 25 1300       Anesthesia Post Evaluation    Patient location during evaluation: PACU  Patient participation: complete - patient participated  Level of consciousness: awake and alert  Pain management: adequate  Airway patency: patent  Cardiovascular status: acceptable and blood pressure returned to baseline  Respiratory status: acceptable  Hydration status: acceptable  Postoperative Nausea and Vomiting: none        There were no known notable events for this encounter.

## 2025-03-18 ENCOUNTER — APPOINTMENT (OUTPATIENT)
Facility: CLINIC | Age: 40
End: 2025-03-18
Payer: COMMERCIAL

## 2025-03-18 ASSESSMENT — PAIN SCALES - GENERAL: PAINLEVEL_OUTOF10: 0 - NO PAIN

## 2025-03-19 ENCOUNTER — APPOINTMENT (OUTPATIENT)
Dept: OBSTETRICS AND GYNECOLOGY | Facility: CLINIC | Age: 40
End: 2025-03-19
Payer: COMMERCIAL

## 2025-03-19 LAB
LABORATORY COMMENT REPORT: NORMAL
PATH REPORT.COMMENTS IMP SPEC: NORMAL
PATH REPORT.FINAL DX SPEC: NORMAL
PATH REPORT.GROSS SPEC: NORMAL
PATH REPORT.RELEVANT HX SPEC: NORMAL
PATH REPORT.TOTAL CANCER: NORMAL

## 2025-03-21 LAB
COMMENTS - MP RESULT TYPE: NORMAL
SCAN RESULT: NORMAL

## 2025-04-08 ENCOUNTER — APPOINTMENT (OUTPATIENT)
Facility: CLINIC | Age: 40
End: 2025-04-08
Payer: COMMERCIAL

## 2025-04-14 ENCOUNTER — APPOINTMENT (OUTPATIENT)
Facility: CLINIC | Age: 40
End: 2025-04-14
Payer: COMMERCIAL

## 2025-04-17 ENCOUNTER — APPOINTMENT (OUTPATIENT)
Dept: OBSTETRICS AND GYNECOLOGY | Facility: CLINIC | Age: 40
End: 2025-04-17
Payer: COMMERCIAL

## 2025-05-14 ENCOUNTER — APPOINTMENT (OUTPATIENT)
Dept: OBSTETRICS AND GYNECOLOGY | Facility: CLINIC | Age: 40
End: 2025-05-14
Payer: COMMERCIAL

## 2025-06-11 ENCOUNTER — APPOINTMENT (OUTPATIENT)
Dept: OBSTETRICS AND GYNECOLOGY | Facility: CLINIC | Age: 40
End: 2025-06-11
Payer: COMMERCIAL

## 2025-06-17 ENCOUNTER — APPOINTMENT (OUTPATIENT)
Dept: PRIMARY CARE | Facility: CLINIC | Age: 40
End: 2025-06-17
Payer: COMMERCIAL

## 2025-06-17 VITALS
SYSTOLIC BLOOD PRESSURE: 128 MMHG | DIASTOLIC BLOOD PRESSURE: 80 MMHG | BODY MASS INDEX: 41.95 KG/M2 | WEIGHT: 293 LBS | HEIGHT: 70 IN

## 2025-06-17 DIAGNOSIS — N92.6 MISSED PERIOD: ICD-10-CM

## 2025-06-17 DIAGNOSIS — E66.9 OBESITY WITHOUT SERIOUS COMORBIDITY, UNSPECIFIED CLASS, UNSPECIFIED OBESITY TYPE: ICD-10-CM

## 2025-06-17 DIAGNOSIS — E03.9 HYPOTHYROIDISM, UNSPECIFIED TYPE: ICD-10-CM

## 2025-06-17 DIAGNOSIS — Z00.00 ANNUAL PHYSICAL EXAM: ICD-10-CM

## 2025-06-17 PROCEDURE — 1036F TOBACCO NON-USER: CPT | Performed by: INTERNAL MEDICINE

## 2025-06-17 PROCEDURE — 99203 OFFICE O/P NEW LOW 30 MIN: CPT | Performed by: INTERNAL MEDICINE

## 2025-06-17 PROCEDURE — 3008F BODY MASS INDEX DOCD: CPT | Performed by: INTERNAL MEDICINE

## 2025-06-18 NOTE — PROGRESS NOTES
"Subjective   Patient ID: eNva Allen is a 39 y.o. female who presents for Establish Care (Various conditions).    This is a 39-year-old [place] lady came to my office first time, I welcomed her.  Her insurance changed that is why she has to change the provider.  Otherwise, she is okay.    PAST MEDICAL HISTORY:  She has missed her periods.  She is worried about it.    CURRENT MEDICATIONS:  Birth control pill.    SOCIAL HISTORY:  Child is [age] years old.    I have personally reviewed the patient's Past Medical History, Medications, Allergies, Social History, and Family History in the EMR.    Review of Systems   All other systems reviewed and are negative.  The patient has never had heart attack, diabetes, or cancer.  She does not snore.  She does not think she has sleep apnea.    Objective   /80   Ht 1.778 m (5' 10\")   Wt 143 kg (315 lb)   BMI 45.20 kg/m²     Physical Exam  Vitals reviewed.   HENT:      Right Ear: Tympanic membrane, ear canal and external ear normal.      Left Ear: Tympanic membrane, ear canal and external ear normal.   Eyes:      General: No scleral icterus.     Pupils: Pupils are equal, round, and reactive to light.   Neck:      Vascular: No carotid bruit.   Cardiovascular:      Heart sounds: Normal heart sounds, S1 normal and S2 normal. No murmur heard.     No friction rub.   Pulmonary:      Effort: Pulmonary effort is normal.      Breath sounds: Normal breath sounds and air entry.   Chest:      Comments: BREAST:  Deferred by the patient.  Abdominal:      Palpations: There is no hepatomegaly, splenomegaly or mass.   Genitourinary:     Comments: VAGINAL:  Deferred by the patient.  RECTAL:  Deferred by the patient.  Musculoskeletal:         General: No swelling or deformity. Normal range of motion.      Cervical back: Neck supple.      Right lower leg: No edema.      Left lower leg: No edema.   Lymphadenopathy:      Cervical: No cervical adenopathy.      Upper Body:      Right upper " body: No axillary adenopathy.      Left upper body: No axillary adenopathy.      Lower Body: No right inguinal adenopathy. No left inguinal adenopathy.   Neurological:      Mental Status: She is oriented to person, place, and time.      Cranial Nerves: Cranial nerves 2-12 are intact. No cranial nerve deficit.      Sensory: No sensory deficit.      Motor: Motor function is intact. No weakness.      Gait: Gait is intact.      Deep Tendon Reflexes: Reflexes normal.   Psychiatric:         Mood and Affect: Mood normal. Mood is not anxious or depressed. Affect is not angry.         Behavior: Behavior is not agitated.         Thought Content: Thought content normal.         Judgment: Judgment normal.     LAB WORK:  Laboratory testing discussed.    Assessment/Plan   Problem List Items Addressed This Visit           ICD-10-CM    Hypothyroidism E03.9    Relevant Orders    Thyroid Stimulating Hormone    Obesity E66.9    Relevant Orders    CBC    Comprehensive Metabolic Panel    Lipid Panel    Thyroid Stimulating Hormone    Urinalysis with Reflex Culture and Microscopic    Hemoglobin A1C     Other Visit Diagnoses         Codes      Annual physical exam     Z00.00    Relevant Orders    CBC    Comprehensive Metabolic Panel    Lipid Panel    Thyroid Stimulating Hormone    Urinalysis with Reflex Culture and Microscopic    Hemoglobin A1C      Missed period     N92.6    Relevant Orders    HCG, quantitative, pregnancy        1. Hypothyroid.  We will check TSH.  2. Missed period.  I am going to check urine for pregnancy.  If it is negative we will check Beta hCG.  If needed we will do ultrasound or other testing.  3. Little overweight.  Complete blood work ordered.  4. I will see her in a couple of weeks after testing.  5. Gynecological.  She is regular with Pap test.  Monitor.  6. Welcome to my office.    Scribe Attestation  By signing my name below, IAlida, Willy attest that this documentation has been prepared under the  direction and in the presence of Mandeep Espinoza MD.     All medical record entries made by the scribe were personally dictated by me I have reviewed the chart and agree the record accurately reflects my personal performance of his history physical examination and management

## 2025-06-19 DIAGNOSIS — N92.6 MISSED PERIOD: ICD-10-CM

## 2025-06-21 LAB
ALBUMIN SERPL-MCNC: 4 G/DL (ref 3.6–5.1)
ALP SERPL-CCNC: 56 U/L (ref 31–125)
ALT SERPL-CCNC: 10 U/L (ref 6–29)
ANION GAP SERPL CALCULATED.4IONS-SCNC: 8 MMOL/L (CALC) (ref 7–17)
APPEARANCE UR: ABNORMAL
AST SERPL-CCNC: 11 U/L (ref 10–30)
BACTERIA #/AREA URNS HPF: ABNORMAL /HPF
BACTERIA UR CULT: ABNORMAL
BACTERIA UR CULT: ABNORMAL
BILIRUB SERPL-MCNC: 0.6 MG/DL (ref 0.2–1.2)
BILIRUB UR QL STRIP: NEGATIVE
BUN SERPL-MCNC: 9 MG/DL (ref 7–25)
CALCIUM SERPL-MCNC: 8.8 MG/DL (ref 8.6–10.2)
CHLORIDE SERPL-SCNC: 103 MMOL/L (ref 98–110)
CHOLEST SERPL-MCNC: 185 MG/DL
CHOLEST/HDLC SERPL: 3.1 (CALC)
CO2 SERPL-SCNC: 26 MMOL/L (ref 20–32)
COLOR UR: YELLOW
CREAT SERPL-MCNC: 0.8 MG/DL (ref 0.5–0.97)
EGFRCR SERPLBLD CKD-EPI 2021: 97 ML/MIN/1.73M2
ERYTHROCYTE [DISTWIDTH] IN BLOOD BY AUTOMATED COUNT: 13.7 % (ref 11–15)
EST. AVERAGE GLUCOSE BLD GHB EST-MCNC: 111 MG/DL
EST. AVERAGE GLUCOSE BLD GHB EST-SCNC: 6.2 MMOL/L
GLUCOSE SERPL-MCNC: 83 MG/DL (ref 65–99)
GLUCOSE UR QL STRIP: NEGATIVE
HBA1C MFR BLD: 5.5 %
HCT VFR BLD AUTO: 42.8 % (ref 35–45)
HDLC SERPL-MCNC: 59 MG/DL
HGB BLD-MCNC: 13.6 G/DL (ref 11.7–15.5)
HGB UR QL STRIP: ABNORMAL
HYALINE CASTS #/AREA URNS LPF: ABNORMAL /LPF
KETONES UR QL STRIP: NEGATIVE
LDLC SERPL CALC-MCNC: 102 MG/DL (CALC)
LEUKOCYTE ESTERASE UR QL STRIP: ABNORMAL
MCH RBC QN AUTO: 29.1 PG (ref 27–33)
MCHC RBC AUTO-ENTMCNC: 31.8 G/DL (ref 32–36)
MCV RBC AUTO: 91.6 FL (ref 80–100)
NITRITE UR QL STRIP: NEGATIVE
NONHDLC SERPL-MCNC: 126 MG/DL (CALC)
PH UR STRIP: 6.5 [PH] (ref 5–8)
PLATELET # BLD AUTO: 353 THOUSAND/UL (ref 140–400)
PMV BLD REES-ECKER: 9.7 FL (ref 7.5–12.5)
POTASSIUM SERPL-SCNC: 4.8 MMOL/L (ref 3.5–5.3)
PROT SERPL-MCNC: 7.6 G/DL (ref 6.1–8.1)
PROT UR QL STRIP: NEGATIVE
RBC # BLD AUTO: 4.67 MILLION/UL (ref 3.8–5.1)
RBC #/AREA URNS HPF: ABNORMAL /HPF
SERVICE CMNT-IMP: ABNORMAL
SODIUM SERPL-SCNC: 137 MMOL/L (ref 135–146)
SP GR UR STRIP: 1.01 (ref 1–1.03)
SQUAMOUS #/AREA URNS HPF: ABNORMAL /HPF
TRIGL SERPL-MCNC: 138 MG/DL
TSH SERPL-ACNC: 6.46 MIU/L
WBC # BLD AUTO: 11.1 THOUSAND/UL (ref 3.8–10.8)
WBC #/AREA URNS HPF: ABNORMAL /HPF

## 2025-06-26 ENCOUNTER — APPOINTMENT (OUTPATIENT)
Dept: PRIMARY CARE | Facility: CLINIC | Age: 40
End: 2025-06-26
Payer: COMMERCIAL

## 2025-06-26 DIAGNOSIS — E03.9 HYPOTHYROIDISM, UNSPECIFIED TYPE: ICD-10-CM

## 2025-06-26 DIAGNOSIS — Z3A.01 LESS THAN 8 WEEKS GESTATION OF PREGNANCY (HHS-HCC): Primary | ICD-10-CM

## 2025-06-26 DIAGNOSIS — E66.3 OVERWEIGHT: ICD-10-CM

## 2025-06-26 PROCEDURE — 3008F BODY MASS INDEX DOCD: CPT | Performed by: INTERNAL MEDICINE

## 2025-06-26 PROCEDURE — 99213 OFFICE O/P EST LOW 20 MIN: CPT | Performed by: INTERNAL MEDICINE

## 2025-06-26 PROCEDURE — 1036F TOBACCO NON-USER: CPT | Performed by: INTERNAL MEDICINE

## 2025-06-26 RX ORDER — LEVOTHYROXINE SODIUM 125 UG/1
125 TABLET ORAL DAILY
Qty: 30 TABLET | Refills: 0 | Status: SHIPPED | OUTPATIENT
Start: 2025-06-26 | End: 2026-06-26

## 2025-06-26 NOTE — PROGRESS NOTES
"Subjective   Patient ID: Neva Allen is a 39 y.o. female who presents for Follow-up (Multiple medical issues).    Neva Allen today came here for multiple medical issues.  Follow-up on blood work.  There is a confusion about her pregnancy test.  She does not think she is pregnant, but she might be very early pregnant.  She started birth control pill after relationship.  So, she is continuously worried.  She could not get appointment with her gynecologist.  She came for follow-up.    I have personally reviewed the patient's Past Medical History, Medications, Allergies, Social History, and Family History in the EMR.    Review of Systems   All other systems reviewed and are negative.    Objective   /72   Ht 1.778 m (5' 10\")   Wt 144 kg (317 lb)   BMI 45.48 kg/m²     Physical Exam  Vitals reviewed.   Cardiovascular:      Heart sounds: Normal heart sounds, S1 normal and S2 normal. No murmur heard.     No friction rub.   Pulmonary:      Effort: Pulmonary effort is normal.      Breath sounds: Normal breath sounds and air entry.   Abdominal:      Palpations: There is no hepatomegaly, splenomegaly or mass.   Musculoskeletal:      Right lower leg: No edema.      Left lower leg: No edema.   Lymphadenopathy:      Lower Body: No right inguinal adenopathy. No left inguinal adenopathy.   Neurological:      Cranial Nerves: Cranial nerves 2-12 are intact.      Sensory: No sensory deficit.      Motor: Motor function is intact.      Deep Tendon Reflexes: Reflexes are normal and symmetric.     LAB WORK: Laboratory testing discussed.    Assessment/Plan   Problem List Items Addressed This Visit           ICD-10-CM    Hypothyroidism E03.9    Relevant Medications    levothyroxine (Synthroid, Levoxyl) 125 mcg tablet    Other Relevant Orders    Thyroid Stimulating Hormone     Other Visit Diagnoses         Codes      Less than 8 weeks gestation of pregnancy ([facility])    -  Primary Z3A.01    Relevant Orders    [facility] HCG, TOTAL, QN      " Overweight     E66.3        1. Hypothyroid.  I bumped up Synthroid to 125 mcg.  2. Possibility of very early pregnancy.  Birth control pill already on the board, a bit complex.  I ordered blood pregnancy test quantitative for eight weeks and less.  I referred her to gynecologist for urgent appointment.  These things are beyond scope of my practice.  3. Overweight.  Diet, exercise.  Monitor.  4. Follow-up in six to eight weeks with repeat tests.    Scribe Attestation  By signing my name below, I, Mayra Thayer attest that this documentation has been prepared under the direction and in the presence of Mandeep Espinoza MD.     All medical record entries made by the mayra were personally dictated by me I have reviewed the chart and agree the record accurately reflects my personal performance of his history physical examination and management

## 2025-06-27 ENCOUNTER — APPOINTMENT (OUTPATIENT)
Dept: ENDOCRINOLOGY | Facility: CLINIC | Age: 40
End: 2025-06-27
Payer: COMMERCIAL

## 2025-06-27 VITALS
DIASTOLIC BLOOD PRESSURE: 72 MMHG | HEIGHT: 70 IN | WEIGHT: 293 LBS | SYSTOLIC BLOOD PRESSURE: 128 MMHG | BODY MASS INDEX: 41.95 KG/M2

## 2025-06-28 ENCOUNTER — TELEPHONE (OUTPATIENT)
Dept: PRIMARY CARE | Facility: CLINIC | Age: 40
End: 2025-06-28
Payer: COMMERCIAL

## 2025-06-28 LAB — B-HCG SERPL-ACNC: <5 MIU/ML

## 2025-06-28 NOTE — TELEPHONE ENCOUNTER
----- Message from Mandeep Espinoza sent at 6/28/2025 12:00 PM EDT -----  PL CALL PT  NOT PREGNANT  ----- Message -----  From: Joe MiArch Results In  Sent: 6/28/2025   8:13 AM EDT  To: Mandeep Espinoza MD

## 2025-07-01 DIAGNOSIS — E03.9 HYPOTHYROIDISM, UNSPECIFIED TYPE: ICD-10-CM

## 2025-07-01 RX ORDER — LEVOTHYROXINE SODIUM 125 UG/1
125 TABLET ORAL DAILY
Qty: 90 TABLET | Refills: 0 | Status: SHIPPED | OUTPATIENT
Start: 2025-07-01 | End: 2025-07-11 | Stop reason: SDUPTHER

## 2025-07-03 ENCOUNTER — APPOINTMENT (OUTPATIENT)
Dept: PRIMARY CARE | Facility: CLINIC | Age: 40
End: 2025-07-03
Payer: COMMERCIAL

## 2025-07-09 ENCOUNTER — APPOINTMENT (OUTPATIENT)
Dept: OBSTETRICS AND GYNECOLOGY | Facility: CLINIC | Age: 40
End: 2025-07-09
Payer: COMMERCIAL

## 2025-07-11 ENCOUNTER — APPOINTMENT (OUTPATIENT)
Dept: OBSTETRICS AND GYNECOLOGY | Facility: CLINIC | Age: 40
End: 2025-07-11
Payer: COMMERCIAL

## 2025-07-11 DIAGNOSIS — E03.9 HYPOTHYROIDISM, UNSPECIFIED TYPE: ICD-10-CM

## 2025-07-11 RX ORDER — LEVOTHYROXINE SODIUM 125 UG/1
125 TABLET ORAL DAILY
Qty: 90 TABLET | Refills: 0 | Status: SHIPPED | OUTPATIENT
Start: 2025-07-11

## 2025-07-15 ENCOUNTER — APPOINTMENT (OUTPATIENT)
Dept: OBSTETRICS AND GYNECOLOGY | Facility: CLINIC | Age: 40
End: 2025-07-15
Payer: COMMERCIAL

## 2025-07-23 ENCOUNTER — APPOINTMENT (OUTPATIENT)
Dept: OBSTETRICS AND GYNECOLOGY | Facility: CLINIC | Age: 40
End: 2025-07-23
Payer: COMMERCIAL

## 2025-08-06 ENCOUNTER — APPOINTMENT (OUTPATIENT)
Dept: OBSTETRICS AND GYNECOLOGY | Facility: CLINIC | Age: 40
End: 2025-08-06
Payer: COMMERCIAL

## 2025-08-15 DIAGNOSIS — E03.9 HYPOTHYROIDISM, UNSPECIFIED TYPE: ICD-10-CM

## 2025-08-15 LAB — TSH SERPL-ACNC: 1.74 MIU/L

## 2025-08-16 RX ORDER — LEVOTHYROXINE SODIUM 125 UG/1
125 TABLET ORAL DAILY
Qty: 90 TABLET | Refills: 0 | Status: SHIPPED | OUTPATIENT
Start: 2025-08-16

## 2025-08-20 ENCOUNTER — APPOINTMENT (OUTPATIENT)
Dept: OBSTETRICS AND GYNECOLOGY | Facility: CLINIC | Age: 40
End: 2025-08-20
Payer: COMMERCIAL

## 2025-09-03 ENCOUNTER — APPOINTMENT (OUTPATIENT)
Dept: OBSTETRICS AND GYNECOLOGY | Facility: CLINIC | Age: 40
End: 2025-09-03
Payer: COMMERCIAL

## 2025-09-10 ENCOUNTER — APPOINTMENT (OUTPATIENT)
Dept: OBSTETRICS AND GYNECOLOGY | Facility: CLINIC | Age: 40
End: 2025-09-10
Payer: COMMERCIAL

## 2025-09-17 ENCOUNTER — APPOINTMENT (OUTPATIENT)
Dept: OBSTETRICS AND GYNECOLOGY | Facility: CLINIC | Age: 40
End: 2025-09-17
Payer: COMMERCIAL

## 2025-09-24 ENCOUNTER — APPOINTMENT (OUTPATIENT)
Dept: OBSTETRICS AND GYNECOLOGY | Facility: CLINIC | Age: 40
End: 2025-09-24
Payer: COMMERCIAL

## 2025-11-20 ENCOUNTER — APPOINTMENT (OUTPATIENT)
Dept: PRIMARY CARE | Facility: CLINIC | Age: 40
End: 2025-11-20
Payer: COMMERCIAL

## (undated) DEVICE — TUBING, SUCTION, VACUUM, INTRAUTERINE, CURETTAGE, HANDLE, MALE ADAPTER, 6 FT X 0.375 IN

## (undated) DEVICE — Device

## (undated) DEVICE — SOLUTION, SCRUB EXIDINE, 4% CHG, 4 OZ

## (undated) DEVICE — GOWN, SURGICAL, SMARTGOWN, XLARGE, STERILE

## (undated) DEVICE — PAD, SANITARY, OBSTETRICAL, W/ADHSV STRIP,11 IN,LF

## (undated) DEVICE — CURETTE, UTERINE, VACUUM, CURVED, BERKLEY, 8 MM

## (undated) DEVICE — GLOVE, SURGICAL, PROTEXIS PI W/NEU-THERA, 7.5, PF, LF

## (undated) DEVICE — KIT, GUIDEWIRE BUCKET, W/LID, F/FETAL REMAINS